# Patient Record
Sex: MALE | Race: WHITE | NOT HISPANIC OR LATINO | Employment: FULL TIME | URBAN - METROPOLITAN AREA
[De-identification: names, ages, dates, MRNs, and addresses within clinical notes are randomized per-mention and may not be internally consistent; named-entity substitution may affect disease eponyms.]

---

## 2017-02-08 ENCOUNTER — ALLSCRIPTS OFFICE VISIT (OUTPATIENT)
Dept: OTHER | Facility: OTHER | Age: 47
End: 2017-02-08

## 2017-05-22 ENCOUNTER — ALLSCRIPTS OFFICE VISIT (OUTPATIENT)
Dept: OTHER | Facility: OTHER | Age: 47
End: 2017-05-22

## 2017-06-06 ENCOUNTER — GENERIC CONVERSION - ENCOUNTER (OUTPATIENT)
Dept: OTHER | Facility: OTHER | Age: 47
End: 2017-06-06

## 2017-06-06 ENCOUNTER — ALLSCRIPTS OFFICE VISIT (OUTPATIENT)
Dept: OTHER | Facility: OTHER | Age: 47
End: 2017-06-06

## 2017-06-06 DIAGNOSIS — G45.9 TRANSIENT CEREBRAL ISCHEMIC ATTACK: ICD-10-CM

## 2017-06-07 ENCOUNTER — GENERIC CONVERSION - ENCOUNTER (OUTPATIENT)
Dept: OTHER | Facility: OTHER | Age: 47
End: 2017-06-07

## 2017-06-07 LAB
A/G RATIO (HISTORICAL): 1.4 (ref 1.2–2.2)
ALBUMIN SERPL BCP-MCNC: 4.3 G/DL (ref 3.5–5.5)
ALP SERPL-CCNC: 86 IU/L (ref 39–117)
ALT SERPL W P-5'-P-CCNC: 73 IU/L (ref 0–44)
AMBIG ABBREV CMP14 DEFAULT (HISTORICAL): NORMAL
AST SERPL W P-5'-P-CCNC: 45 IU/L (ref 0–40)
BASOPHILS # BLD AUTO: 0 %
BASOPHILS # BLD AUTO: 0 X10E3/UL (ref 0–0.2)
BILIRUB SERPL-MCNC: 0.4 MG/DL (ref 0–1.2)
BUN SERPL-MCNC: 17 MG/DL (ref 6–24)
BUN/CREA RATIO (HISTORICAL): 15 (ref 9–20)
CALCIUM SERPL-MCNC: 9.4 MG/DL (ref 8.7–10.2)
CHLORIDE SERPL-SCNC: 102 MMOL/L (ref 96–106)
CHOLEST SERPL-MCNC: 157 MG/DL (ref 100–199)
CO2 SERPL-SCNC: 20 MMOL/L (ref 18–29)
CREAT SERPL-MCNC: 1.1 MG/DL (ref 0.76–1.27)
DEPRECATED RDW RBC AUTO: 14.4 % (ref 12.3–15.4)
EGFR AFRICAN AMERICAN (HISTORICAL): 93 ML/MIN/1.73
EGFR-AMERICAN CALC (HISTORICAL): 80 ML/MIN/1.73
EOSINOPHIL # BLD AUTO: 0.2 X10E3/UL (ref 0–0.4)
EOSINOPHIL # BLD AUTO: 2 %
GLUCOSE SERPL-MCNC: 111 MG/DL (ref 65–99)
HCT VFR BLD AUTO: 44.2 % (ref 37.5–51)
HDLC SERPL-MCNC: 34 MG/DL
HGB BLD-MCNC: 15.6 G/DL (ref 12.6–17.7)
IMM.GRANULOCYTES (CD4/8) (HISTORICAL): 0 %
IMM.GRANULOCYTES (CD4/8) (HISTORICAL): 0 X10E3/UL (ref 0–0.1)
LDL/HDL RATIO (HISTORICAL): 2.3 RATIO UNITS (ref 0–3.6)
LDLC SERPL CALC-MCNC: 78 MG/DL (ref 0–99)
LYMPHOCYTES # BLD AUTO: 2.6 X10E3/UL (ref 0.7–3.1)
LYMPHOCYTES # BLD AUTO: 28 %
MCH RBC QN AUTO: 30.4 PG (ref 26.6–33)
MCHC RBC AUTO-ENTMCNC: 35.3 G/DL (ref 31.5–35.7)
MCV RBC AUTO: 86 FL (ref 79–97)
MONOCYTES # BLD AUTO: 0.8 X10E3/UL (ref 0.1–0.9)
MONOCYTES (HISTORICAL): 8 %
NEUTROPHILS # BLD AUTO: 5.7 X10E3/UL (ref 1.4–7)
NEUTROPHILS # BLD AUTO: 62 %
PLATELET # BLD AUTO: 219 X10E3/UL (ref 150–379)
POTASSIUM SERPL-SCNC: 4 MMOL/L (ref 3.5–5.2)
RBC (HISTORICAL): 5.13 X10E6/UL (ref 4.14–5.8)
SODIUM SERPL-SCNC: 142 MMOL/L (ref 134–144)
TOT. GLOBULIN, SERUM (HISTORICAL): 3.1 G/DL (ref 1.5–4.5)
TOTAL PROTEIN (HISTORICAL): 7.4 G/DL (ref 6–8.5)
TRIGL SERPL-MCNC: 227 MG/DL (ref 0–149)
VLDLC SERPL CALC-MCNC: 45 MG/DL (ref 5–40)
WBC # BLD AUTO: 9.3 X10E3/UL (ref 3.4–10.8)

## 2017-06-08 ENCOUNTER — GENERIC CONVERSION - ENCOUNTER (OUTPATIENT)
Dept: OTHER | Facility: OTHER | Age: 47
End: 2017-06-08

## 2017-06-08 LAB
INTERPRETATION (HISTORICAL): NORMAL
PROSTATE SPECIFIC ANTIGEN (HISTORICAL): 0.8 NG/ML (ref 0–4)
TSH SERPL DL<=0.05 MIU/L-ACNC: 2.62 UIU/ML (ref 0.45–4.5)

## 2017-06-12 ENCOUNTER — ALLSCRIPTS OFFICE VISIT (OUTPATIENT)
Dept: OTHER | Facility: OTHER | Age: 47
End: 2017-06-12

## 2017-06-12 DIAGNOSIS — R74.8 ABNORMAL LEVELS OF OTHER SERUM ENZYMES: ICD-10-CM

## 2017-06-12 LAB — OCCULT BLD, FECAL IMMUNOLOGICAL (HISTORICAL): NEGATIVE

## 2017-06-19 ENCOUNTER — GENERIC CONVERSION - ENCOUNTER (OUTPATIENT)
Dept: OTHER | Facility: OTHER | Age: 47
End: 2017-06-19

## 2017-06-20 ENCOUNTER — GENERIC CONVERSION - ENCOUNTER (OUTPATIENT)
Dept: OTHER | Facility: OTHER | Age: 47
End: 2017-06-20

## 2017-12-12 ENCOUNTER — ALLSCRIPTS OFFICE VISIT (OUTPATIENT)
Dept: OTHER | Facility: OTHER | Age: 47
End: 2017-12-12

## 2017-12-13 NOTE — PROGRESS NOTES
Assessment    1  Benign essential hypertension (401 1) (I10)   2  Other acute sinusitis (461 8) (J01 80)   3  Never a smoker   4  BMI 34 0-34 9,adult (V85 34) (Z68 34)   5  Obesity, Class I, BMI 30-34 9 (278 00) (E66 9)    Plan  Benign essential hypertension    · Enalapril Maleate 10 MG Oral Tablet; 1 every day  Obesity, Class I, BMI 30-34 9    · Avoid alcoholic beverages ; Status:Complete;   Done: 34GIU4599   · Begin a limited exercise program ; Status:Complete;   Done: 63OOM8609   · Begin or continue regular aerobic exercise  Gradually work up to at least 3 sessions of 30minutes of exercise a week ; Status:Complete;   Done: 29FKH6735   · Eat a low fat and low cholesterol diet ; Status:Complete;   Done: 49OEF1748   · Keep a diary of when and what you eat ; Status:Complete;   Done: 94YEZ4658   · Shared Decision Making Aid given; Status:Complete;   Done: 70VRM7876   · Some eating tips that can help you lose weight ; Status:Complete;   Done: 20UMV9154   · Stretch and warm up your muscles during the first 10 minutes , then cool down yourmuscles for the last 10 minutes of exercise ; Status:Complete;   Done: 70WPX1897   · There are many exercise options for seniors ; Status:Complete;   Done: 02HDE7999   · We encourage all of our patients to exercise regularly  30 minutes of exercise or physicalactivity five or more days a week is recommended for children and adults  ;Status:Complete;   Done: 56SBG6089   · We recommend that you bring your body mass index down to 26 ; Status:Complete;  Done: 06RQP9748   · We recommend that you change your eating habits slowly ; Status:Complete;   Done:14Chg4330   · We recommend you modify your diet to achieve and maintain a healthy weight  Lynnnick Campoverde may increase your risk for developing health problems such as diabetes,heart disease, and cancer  Avoid high fat foods and eat a balanced diet richin fruits and vegetables   The combination of a reduced-calorie diet and increasedphysical activity is recommended  Please let us know if you would like tolearn more about your nutrition and calorie needs, and additional options includingweight loss programs that can help you achieve your goals ; Status:Complete;   Done:39Pdy4530   · We want you to follow the Therapeutic Lifestyle Changes (TLC) diet ; Status:Complete;  Done: 07MZP5508   · Call [phone] if: You are considering suicide ; Status:Complete;   Done:72Cqh4372   · Call [phone] if: You are having difficulty sleeping (insomnia) ; Status:Complete;  Done: 16WHV0219   · Call [phone] if: You are urinating too frequently ; Status:Complete;   Done:79Ffu5503   · Call [phone] if: You feel thirsty most of the time ; Status:Complete;   Done:02Caw6897   · Call [phone] if: You feel your heart is beating very fast or skipping beats  ;Status:Complete;   Done: 51XVM7665   · Call [phone] if: You have feelings of extreme sadness and feelings ofhopelessness ; Status:Complete;   Done: 21BZN1445   · Call [phone] if: You have pain in your abdomen ; Status:Complete;   Done:89Stc4233   · Call [phone] if: You have symptoms of sleep apnea ; Status:Complete;   Done:75Wto9818   · Call [phone] if: You have sudden or severe chest pain with shortness of breath, rapidbreathing, or cough ; Status:Complete;   Done: 83WPU9359   · Seek Immediate Medical Attention if: You experience a new kind of chest pain (angina)or pressure ; Status:Complete;   Done: 88UJV2036  Other acute sinusitis    · Clarithromycin 500 MG Oral Tablet; TAKE 1 TABLET EVERY 12 HOURS DAILY    Chief Complaint  sinus congestion -ear discomfort      History of Present Illness  HPI: day after thanks giving developed sore throat and it seems to have ascended to his head   he has sinus congestion and painfever no chills      Review of Systems   Constitutional: feeling poorly, but-- as noted in HPI   ENT: earache,-- sore throat,-- nasal discharge-- and-- hoarseness, but-- as noted in HPI  Cardiovascular: no complaints of slow or fast heart rate, no chest pain, no palpitations, no leg claudication or lower extremity edema  Respiratory: cough, but-- as noted in HPI  Gastrointestinal: no complaints of abdominal pain, no constipation, no nausea or vomiting, no diarrhea or bloody stools  Genitourinary: no complaints of dysuria or incontinence, no hesitancy, no nocturia, no genital lesion, no inadequacy of penile erection  Musculoskeletal: no complaints of arthralgia, no myalgia, no joint swelling or stiffness, no limb pain or swelling  Active Problems  1  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)   2  Benign essential hypertension (401 1) (I10)   3  Elevated liver enzymes (790 5) (R74 8)   4  Encounter for screening for cardiovascular disorders (V81 2) (Z13 6)   5  High triglycerides (272 1) (E78 1)   6  Pemphigus (694 4) (L10 9)   7  Screening for diabetes mellitus (DM) (V77 1) (Z13 1)   8  Thyroid disorder (246 9) (E07 9)   9  TIA (transient ischemic attack) (435 9) (G45 9)   10  Well adult on routine health check (V70 0) (Z00 00)    Past Medical History  1  History of _ (526 89)   2  History of _ (272 2)   3  History of Abnormal findings on diagnostic imaging of abdomen (793 6) (R93 5)   4  History of Acute maxillary sinusitis (461 0) (J01 00)   5  History of Acute maxillary sinusitis, recurrence not specified (461 0) (J01 00)   6  History of Acute nonsuppurative otitis media, unspecified laterality (381 00) (H65 199)   7  History of Acute upper respiratory infection (465 9) (J06 9)   8  History of Allergic Reaction (995 3)   9  History of BMI 36 0-36 9,adult (V85 36) (Z68 36)   10  History of acute bronchitis (V12 69) (Z87 09)   11  History of acute pharyngitis (V12 69) (Z87 09)   12  History of acute sinusitis (V12 69) (Z87 09)   13  History of chest pain (V13 89) (Z87 898)   14  History of conjunctivitis (V12 49) (Z86 69)   15  History of diarrhea (V12 79) (Z87 898)   16  History of lymphadenopathy (V13 89) (Z87 898)   17  History of pharyngitis (V12 69) (Z87 09)   18  History of viral infection (V12 09) (Z86 19)   19  History of Obesity, Class II, BMI 35-39 9 (278 00) (E66 9)   20  Otitis externa (380 10) (H60 90)   21  History of Suppurative lymphadenitis (683) (L04 9)  Active Problems And Past Medical History Reviewed: The active problems and past medical history were reviewed and updated today  Family History  Father    1  Family history of malignant neoplasm of prostate (V16 42) (Z80 45)  Family History Reviewed: The family history was reviewed and updated today  Social History   · Never a smoker  The social history was reviewed and updated today  Surgical History    1  History of Knee Surgery  Surgical History Reviewed: The surgical history was reviewed and updated today  Current Meds   1  Aspirin 81 MG CAPS; 1 DAILY; Therapy: (Recorded:12Jun2017) to Recorded   2  CellCept 500 MG Oral Tablet; one in am -two in pm; Therapy: 43RPG9840 to Recorded   3  Enalapril Maleate 10 MG Oral Tablet; 1 every day; Therapy: 38WDW8151 to (Last Rx:06Jun2017)  Requested for: 12Jun2017 Ordered   4  Prevacid 15 MG Oral Capsule Delayed Release; One a day; Therapy: 88IBC8244 to Recorded   5  Vascepa 1 GM Oral Capsule; TAKE 2 CAPSULES BY MOUTH TWICE DAILY; Therapy: 42FQQ2571 to (Evaluate:03Dec2017)  Requested for: 82UYE0713; Last Rx:06Jun2017 Ordered    Allergies  1  Penicillins    Vitals   Recorded: 12Dec2017 11:04AM   Temperature 98 1 F   Heart Rate 84   Respiration 20   Systolic 192   Diastolic 76   Height 6 ft 3 in   Weight 276 lb    BMI Calculated 34 5   BSA Calculated 2 52       Physical Exam   Constitutional  General appearance: No acute distress, well appearing and well nourished  Eyes  Conjunctiva and lids: No swelling, erythema, or discharge     Ears, Nose, Mouth, and Throat  External inspection of ears and nose: Normal    Otoscopic examination: Abnormal   The right tympanic membrane was bulging  The left tympanic membrane was bulging  Nasal mucosa, septum, and turbinates: Abnormal   The bilateral nasal mucosa was red  Oropharynx: Abnormal   The posterior pharynx was erythematous  Pulmonary  Auscultation of lungs: Clear to auscultation, equal breath sounds bilaterally, no wheezes, no rales, no rhonci  Cardiovascular  Auscultation of heart: Normal rate and rhythm, normal S1 and S2, without murmurs           Signatures   Electronically signed by : Alyssa Morse DO; Dec 12 2017 11:14AM EST                       (Author)

## 2018-01-13 VITALS
TEMPERATURE: 98.9 F | SYSTOLIC BLOOD PRESSURE: 124 MMHG | RESPIRATION RATE: 12 BRPM | BODY MASS INDEX: 35.44 KG/M2 | WEIGHT: 276 LBS | HEART RATE: 80 BPM | DIASTOLIC BLOOD PRESSURE: 90 MMHG

## 2018-01-14 VITALS
DIASTOLIC BLOOD PRESSURE: 76 MMHG | WEIGHT: 267 LBS | HEIGHT: 75 IN | SYSTOLIC BLOOD PRESSURE: 114 MMHG | HEART RATE: 68 BPM | TEMPERATURE: 99.1 F | RESPIRATION RATE: 16 BRPM | BODY MASS INDEX: 33.2 KG/M2

## 2018-01-15 NOTE — MISCELLANEOUS
Provider Comments  Provider Comments:   called patient about no show appointment  called cell phone and LMOM to call back and reschedule  Then called home phone and there was no answer there        Signatures   Electronically signed by : Heron Newberry DO; Feb 8 2017  9:39PM EST                       (Author)

## 2018-01-16 NOTE — PROGRESS NOTES
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)   7  Elevated liver enzymes (790 5) (R74 8)    Plan  Benign essential hypertension    · Enalapril Maleate 10 MG Oral Tablet; 1 every day  Benign essential hypertension, TIA (transient ischemic attack)    · Aspirin 81 MG CAPS; 1 DAILY  Elevated liver enzymes    · (1) COMPREHENSIVE METABOLIC PANEL; Status:Active; Requested for:96Aet9420;    · US LIVER; Status:Active; Requested OJQ:11ZCG7085; Health Maintenance    · DIGITAL RECTAL EXAM; Status:Complete;   Done: 67YAF9096 11:04AM   · Hemoccult Screening - POC; Status:Complete;   Done: 36MJR2270 11:03AM    Chief Complaint  cpe      History of Present Illness  HM, Adult Male: The patient is being seen for a health maintenance evaluation  General Health:   Screening:   HPI: pt is here for full physical  Pt does have a 1st dgree relative with prostate Ca - pt does not see a urologist    Pt was diagnosed with 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 , pt states this is being watched by GI and he will be having a follow up      Review of Systems    Constitutional: No fever or chills, feels well, no tiredness, no recent weight gain or weight loss  Eyes: No complaints of eye pain, no red eyes, no discharge from eyes, no itchy eyes  ENT: no complaints of earache, no hearing loss, no nosebleeds, no nasal discharge, no sore throat, no hoarseness  Cardiovascular: No complaints of slow heart rate, no fast heart rate, no chest pain, no palpitations, no leg claudication, no lower extremity  Respiratory: No complaints of shortness of breath, no wheezing, no cough, no SOB on exertion, no orthopnea or PND     Gastrointestinal: No complaints of abdominal pain, no constipation, no nausea or vomiting, no diarrhea or bloody stools  Genitourinary: No complaints of dysuria, no incontinence, no hesitancy, no nocturia, no genital lesion, no testicular pain  Musculoskeletal: No complaints of arthralgia, no myalgias, no joint swelling or stiffness, no limb pain or swelling  Integumentary: No complaints of skin rash or skin lesions, no itching, no skin wound, no dry skin  Neurological: No compliants of headache, no confusion, no convulsions, no numbness or tingling, no dizziness or fainting, no limb weakness, no difficulty walking  Psychiatric: Is not suicidal, no sleep disturbances, no anxiety or depression, no change in personality, no emotional problems  Endocrine: No complaints of proptosis, no hot flashes, no muscle weakness, no erectile dysfunction, no deepening of the voice, no feelings of weakness  Hematologic/Lymphatic: No complaints of swollen glands, no swollen glands in the neck, does not bleed easily, no easy bruising  Active Problems    1  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)   2  Benign essential hypertension (401 1) (I10)   3  BMI 36 0-36 9,adult (V85 36) (Z68 36)   4  Encounter for screening for cardiovascular disorders (V81 2) (Z13 6)   5  Encounter for screening for malignant neoplasm of prostate (V76 44) (Z12 5)   6  High triglycerides (272 1) (E78 1)   7  Obesity, Class II, BMI 35-39 9 (278 00) (E66 9)   8  Pemphigus (694 4) (L10 9)   9  Screening for diabetes mellitus (DM) (V77 1) (Z13 1)   10  Screening for thyroid disorder (V77 0) (Z13 29)   11  Thyroid disorder (246 9) (E07 9)   12  TIA (transient ischemic attack) (435 9) (G45 9)   13   Well adult on routine health check (V70 0) (Z00 00)    Past Medical History    · History of _ (526 89)   · History of _ (272 2)   · History of Abnormal findings on diagnostic imaging of abdomen (793 6) (R93 5)   · History of Acute maxillary sinusitis (461 0) (J01 00)   · History of Acute maxillary sinusitis, recurrence not specified (461 0) (J01 00)   · History of Acute nonsuppurative otitis media, unspecified laterality (381 00) (H65 199)   · History of Acute upper respiratory infection (465 9) (J06 9)   · History of Allergic Reaction (995 3)   · History of acute bronchitis (V12 69) (Z87 09)   · History of acute pharyngitis (V12 69) (Z87 09)   · History of acute sinusitis (V12 69) (Z87 09)   · History of chest pain (V13 89) (U86 301)   · History of conjunctivitis (V12 49) (Z86 69)   · History of diarrhea (V12 79) (Y72 954)   · History of lymphadenopathy (V13 89) (L56 584)   · History of pharyngitis (V12 69) (Z87 09)   · History of viral infection (V12 09) (Z86 19)   · Otitis externa (380 10) (H60 90)   · History of Suppurative lymphadenitis (683) (L04 9)    Surgical History    · History of Knee Surgery    Family History  Father    · Family history of malignant neoplasm of prostate (V16 42) (Z80 45)    Social History    · Never a smoker    Current Meds   1  Aspirin 81 MG CAPS; 1 DAILY; Therapy: (Recorded:12Jun2017) to Recorded   2  Enalapril Maleate 10 MG Oral Tablet; 1 every day; Therapy: 86AWG6573 to (Last Rx:06Jun2017)  Requested for: 06Jun2017 Ordered   3  TobraDex 0 3-0 1 % Ophthalmic Suspension; use as dir; Therapy: (Elias Rojas) to Recorded   4  Vascepa 1 GM Oral Capsule; TAKE 2 CAPSULES BY MOUTH TWICE DAILY; Therapy: 66XFX6797 to (Evaluate:05Bma0986)  Requested for: 77HBT7593; Last   Rx:06Jun2017 Ordered    Allergies    1  Penicillins    Vitals   Recorded: 12Jun2017 10:44AM   Temperature 99 1 F   Heart Rate 68   Respiration 16   Systolic 664   Diastolic 76   Height 6 ft 3 in   Weight 267 lb    BMI Calculated 33 37   BSA Calculated 2 48     Physical Exam    Constitutional   General appearance: Abnormal   obese  Head and Face   Head and face: Normal     Palpation of the face and sinuses: No sinus tenderness  Eyes   Conjunctiva and lids: Abnormal   eyes red and hyperemic chronically     Pupils and irises: Equal, round, reactive to light  Ears, Nose, Mouth, and Throat   External inspection of ears and nose: Normal     Otoscopic examination: Tympanic membranes translucent with normal light reflex  Canals patent without erythema  Hearing: Normal     Nasal mucosa, septum, and turbinates: Normal without edema or erythema  Lips, teeth, and gums: Normal, good dentition  Oropharynx: Normal with no erythema, edema, exudate or lesions  Neck   Neck: Supple, symmetric, trachea midline, no masses  Thyroid: Normal, no thyromegaly  Pulmonary   Respiratory effort: No increased work of breathing or signs of respiratory distress  Auscultation of lungs: Clear to auscultation  Cardiovascular   Auscultation of heart: Normal rate and rhythm, normal S1 and S2, no murmurs  Carotid pulses: 2+ bilaterally  Abdominal aorta: Normal     Abdomen   Abdomen: Non-tender, no masses  Liver and spleen: No hepatomegaly or splenomegaly  Examination for hernias: No hernias appreciated  Anus, perineum, and rectum: Normal sphincter tone, no masses, no prolapse  Stool sample for occult blood: Negative  Genitourinary   Scrotal contents: Normal testes, no masses  Penis: Normal, no lesions  Digital rectal exam of prostate: Normal size, no masses  Lymphatic   Palpation of lymph nodes in neck: No lymphadenopathy  Palpation of lymph nodes in axillae: No lymphadenopathy  Palpation of lymph nodes in groin: No lymphadenopathy  Palpation of lymph nodes in other areas: No lymphadenopathy  Musculoskeletal   Gait and station: Normal     Inspection/palpation of digits and nails: Normal without clubbing or cyanosis      Inspection/palpation of joints, bones, and muscles: Normal     Range of motion: Normal        Results/Data  DIGITAL RECTAL EXAM 12Jun2017 11:04AM Edie Benitez     Test Name Result Flag Reference   DIGITAL RECTAL EXAM [id number]       Hemoccult Screening - POC 02DHW8798 11:03AM David Thomas Camilla Jammie     Test Name Result Flag Reference   Hemoccult Negative       [facility]) CBC/Diff Ambiguous Default 11TFN0386 12:15PM Oneil Bame     Test Name Result Flag Reference   WBC 9 3 x10E3/uL  3 4-10 8   RBC 5 13 x10E6/uL  4 14-5 80   Hemoglobin 15 6 g/dL  12 6-17 7   Hematocrit 44 2 %  37 5-51 0   MCV 86 fL  79-97   MCH 30 4 pg  26 6-33 0   MCHC 35 3 g/dL  31 5-35 7   RDW 14 4 %  12 3-15 4   Platelets 260 B66X4/IE  150-379   Neutrophils 62 %     Lymphs 28 %     Monocytes 8 %     Eos 2 %     Basos 0 %     Neutrophils (Absolute) 5 7 x10E3/uL  1 4-7 0   Lymphs (Absolute) 2 6 x10E3/uL  0 7-3 1   Monocytes(Absolute) 0 8 x10E3/uL  0 1-0 9   Eos (Absolute) 0 2 x10E3/uL  0 0-0 4   Baso (Absolute) 0 0 x10E3/uL  0 0-0 2   Immature Granulocytes 0 %     Immature Grans (Abs) 0 0 x10E3/uL  0 0-0 1     (1) PSA (SCREEN) (Dx V76 44 Screen for Prostate Cancer) 24OVU1034 12:15PM Oneil "[employer]e     Test Name Result Flag Reference   Prostate Specific Ag, Serum 0 8 ng/mL  0 0-4 0   Roche ECLIA methodology  According to the American Urological Association, Serum PSA should  decrease and remain at undetectable levels after radical  prostatectomy  The AUA defines biochemical recurrence as an initial  PSA value 0 2 ng/mL or greater followed by a subsequent confirmatory  PSA value 0 2 ng/mL or greater  Values obtained with different assay methods or kits cannot be used  interchangeably  Results cannot be interpreted as absolute evidence  of the presence or absence of malignant disease       (1) COMPREHENSIVE METABOLIC PANEL [id number] 25:55VI Oneil Bame     Test Name Result Flag Reference   Glucose, Serum 111 mg/dL H 65-99   BUN 17 mg/dL  6-24   Creatinine, Serum 1 10 mg/dL  0 76-1 27   BUN/Creatinine Ratio 15  9-20   Sodium, Serum 142 mmol/L  134-144   Potassium, Serum 4 0 mmol/L  3 5-5 2   Chloride, Serum 102 mmol/L     Carbon Dioxide, Total 20 mmol/L  18-29   Calcium, Serum 9 4 mg/dL  8 7-10 2   Protein, Total, Serum 7 4 g/dL 6 0-8 5   Albumin, Serum 4 3 g/dL  3 5-5 5   Globulin, Total 3 1 g/dL  1 5-4 5   A/G Ratio 1 4  1 2-2 2   Bilirubin, Total 0 4 mg/dL  0 0-1 2   Alkaline Phosphatase, S 86 IU/L     AST (SGOT) 45 IU/L H 0-40   ALT (SGPT) 73 IU/L H 0-44   eGFR If NonAfricn Am 80 mL/min/1 73  >59   eGFR If Africn Am 93 mL/min/1 73  >59     (LC) Lipid Panel With LDL/HDL Ratio 38BVY4699 12:15PM Edie Benitez     Test Name Result Flag Reference   Cholesterol, Total 157 mg/dL  100-199   Triglycerides 227 mg/dL H 0-149   HDL Cholesterol 34 mg/dL L >39   VLDL Cholesterol Iglesia 45 mg/dL H 5-40   LDL Cholesterol Calc 78 mg/dL  0-99   LDL/HDL Ratio 2 3 ratio units  0 0-3 6   LDL/HDL Ratio                                                             Men  Women                                               1/2 Avg  Risk  1 0    1 5                                                   Avg Risk  3 6    3 2                                                2X Avg  Risk  6 2    5 0                                                3X Avg  Risk  8 0    6 1       Procedure    Procedure: Indication: routine screening     Results: 20/20 in both eyes with corrective device, 20/20 in the right eye with corrective device, 20/20 in the left eye with corrective device   Color vision was and the results were normal       Signatures   Electronically signed by : Heron Newberry DO; Jun 12 2017 11:27AM EST                       (Author)

## 2018-01-16 NOTE — RESULT NOTES
Discussion/Summary   will discuss labs at follow up appt     Verified Results  [facility]) CBC/Diff Ambiguous Default [id number] 12:15PM Mihir Freud     Test Name Result Flag Reference   WBC 9 3 x10E3/uL  3 4-10 8   RBC 5 13 x10E6/uL  4 14-5 80   Hemoglobin 15 6 g/dL  12 6-17 7   Hematocrit 44 2 %  37 5-51 0   MCV 86 fL  79-97   MCH 30 4 pg  26 6-33 0   MCHC 35 3 g/dL  31 5-35 7   RDW 14 4 %  12 3-15 4   Platelets 023 S23H4/LV  150-379   Neutrophils 62 %     Lymphs 28 %     Monocytes 8 %     Eos 2 %     Basos 0 %     Neutrophils (Absolute) 5 7 x10E3/uL  1 4-7 0   Lymphs (Absolute) 2 6 x10E3/uL  0 7-3 1   Monocytes(Absolute) 0 8 x10E3/uL  0 1-0 9   Eos (Absolute) 0 2 x10E3/uL  0 0-0 4   Baso (Absolute) 0 0 x10E3/uL  0 0-0 2   Immature Granulocytes 0 %     Immature Grans (Abs) 0 0 x10E3/uL  0 0-0 1     [facility]) Devi Weber CMP14 Default [id number] 12:15PM Mihir Freud     Test Name Result Flag Reference   Devi Weber CMP14 Default Comment     A hand-written panel/profile was received from your office  In  accordance with the [facility] Ambiguous Test Code Policy dated July 0094, we have completed your order by using the closest currently  or formerly recognized AMA panel  We have assigned Comprehensive  Metabolic Panel (14), Test Code #059536 to this request   If this  is not the testing you wished to receive on this specimen, please  contact the [address] [place] [address] Client Inquiry/Technical Services Department  to clarify the test order  We appreciate your business  (1) PSA (SCREEN) (Dx V76 44 Screen for Prostate Cancer) [id number] 12:15PM Mihir Freud     Test Name Result Flag Reference   Prostate Specific Ag, Serum 0 8 ng/mL  0 0-4 0   Roche ECLIA methodology  According to the American Urological Association, Serum PSA should  decrease and remain at undetectable levels after radical  prostatectomy   The AUA defines biochemical recurrence as an initial  PSA value 0 2 ng/mL or greater followed by a subsequent confirmatory  PSA value 0 2 ng/mL or greater  Values obtained with different assay methods or kits cannot be used  interchangeably  Results cannot be interpreted as absolute evidence  of the presence or absence of malignant disease  [facility]) Cardiovascular Risk Assessment 07Jun2017 12:15PM George Blind     Test Name Result Flag Reference   Interpretation Note     Supplement report is available  PDF Image   (1) COMPREHENSIVE METABOLIC PANEL [id number] 99:21IK George Blind     Test Name Result Flag Reference   Glucose, Serum 111 mg/dL H 65-99   BUN 17 mg/dL  6-24   Creatinine, Serum 1 10 mg/dL  0 76-1 27   BUN/Creatinine Ratio 15  9-20   Sodium, Serum 142 mmol/L  134-144   Potassium, Serum 4 0 mmol/L  3 5-5 2   Chloride, Serum 102 mmol/L     Carbon Dioxide, Total 20 mmol/L  18-29   Calcium, Serum 9 4 mg/dL  8 7-10 2   Protein, Total, Serum 7 4 g/dL  6 0-8 5   Albumin, Serum 4 3 g/dL  3 5-5 5   Globulin, Total 3 1 g/dL  1 5-4 5   A/G Ratio 1 4  1 2-2 2   Bilirubin, Total 0 4 mg/dL  0 0-1 2   Alkaline Phosphatase, S 86 IU/L     AST (SGOT) 45 IU/L H 0-40   ALT (SGPT) 73 IU/L H 0-44   eGFR If NonAfricn Am 80 mL/min/1 73  >59   eGFR If Africn Am 93 mL/min/1 73  >59     (LC) Lipid Panel With LDL/HDL Ratio [id number] 12:15PM George Blind     Test Name Result Flag Reference   Cholesterol, Total 157 mg/dL  100-199   Triglycerides 227 mg/dL H 0-149   HDL Cholesterol 34 mg/dL L >39   VLDL Cholesterol Iglesia 45 mg/dL H 5-40   LDL Cholesterol Calc 78 mg/dL  0-99   LDL/HDL Ratio 2 3 ratio units  0 0-3 6   LDL/HDL Ratio                                                             Men  Women                                               1/2 Avg  Risk  1 0    1 5                                                   Avg Risk  3 6    3 2                                                2X Avg  Risk  6 2    5 0                                                3X Avg  Risk  8 0    6 1     (1) TSH [id number] 12:15PM Asha Moe Emily Johnson     Test Name Result Flag Reference   TSH 2 620 uIU/mL  0 450-4 500

## 2018-01-18 NOTE — MISCELLANEOUS
Provider Comments  Provider Comments:   Called No Show and mailbox is full  Cannot leave a message   Will try to call other phone no      Signatures   Electronically signed by : Radha Huff DO; Oct  3 2016  1:12PM EST                       (Author)

## 2018-01-22 VITALS
SYSTOLIC BLOOD PRESSURE: 124 MMHG | DIASTOLIC BLOOD PRESSURE: 90 MMHG | TEMPERATURE: 98.2 F | BODY MASS INDEX: 34.78 KG/M2 | RESPIRATION RATE: 18 BRPM | HEIGHT: 74 IN | HEART RATE: 76 BPM | WEIGHT: 271 LBS

## 2018-01-23 VITALS
BODY MASS INDEX: 34.32 KG/M2 | TEMPERATURE: 98.1 F | SYSTOLIC BLOOD PRESSURE: 116 MMHG | HEIGHT: 75 IN | RESPIRATION RATE: 20 BRPM | WEIGHT: 276 LBS | DIASTOLIC BLOOD PRESSURE: 76 MMHG | HEART RATE: 84 BPM

## 2018-01-23 NOTE — MISCELLANEOUS
Assessment   1  TIA (transient ischemic attack) (435 9) (G45 9)1   2  Benign essential hypertension (401 1) (I10)1   3  High triglycerides (272 1) (E78 1)1   4  Never a smoker1      1 Amended By: Claudio Sebastian ; Jun 06 2017 11:47 AM EST    Plan  Benign essential hypertension    · Start: Enalapril Maleate 10 MG Oral Tablet; 1 every day1   Rx By: Claudio Sebastian; Dispense: 0 Days ; #:90 Tablet; Refill: 1;For: Benign essential   hypertension; HORACIO = N;1 1,2  Verified Transmission to [facility] #816;2 1,2  Last Updated By: System, [employer]; 6/6/2017 11:47:49   AM2   Benign essential hypertension, High triglycerides, TIA (transient ischemic attack)    · Start: Vascepa 1 GM Oral Capsule; TAKE 2 CAPSULES BY MOUTH TWICE DAILY1   Rx By: Claudio Sebastian; Dispense: 90 Days ; #:360 Capsule; Refill: 1;For: Benign   essential hypertension, High triglycerides, TIA (transient ischemic attack); HORACIO = N;   1    1,2    Verified Transmission to [facility] #816;   2    1,2    Last Updated By: System, [employer]; 6/6/2017 11:47:48 AM   2   TIA (transient ischemic attack)    · (1) LYME ANTIBODY PROFILE W/REFLEX TO WESTERN BLOT; Status:Active; Requested  IPX:[id number]; 1   Perform:[facility]; BLQ:[id number]; Ordered; For:TIA (transient ischemic attack); Ordered   By:Lombardi, Aldon Keen T1    · ECHO COMPLETE WITH CONTRAST IF INDICATED; Status:Need Information - Financial  Authorization; Requested GEZ:[id number]; 1   Perform:[facility]; KBP:[id number]; Ordered; For:TIA (transient ischemic   attack); Ordered By:Lombardi, Aldon Keen T1      1 Amended By: Claudio Sebastian ; Jun 06 2017 11:42 AM EST   2 Amended By: Claudio Sebastian ; Jun 06 2017 11:48 AM EST    Discussion/Summary  Discussion Summary:   Long conversation had with pt about secondary prevention, BP control as well as trigs etc  PT now on ASA and should stay on it   I will get his labs data from hospital not back yet  Will follow bP at his physical1        1 Amended By: Quintin Sousa ; Jun 06 2017 11:50 AM EST    Chief Complaint   I spoke with Bing Quintero on 6/5/17 after his hospital discharge to home yesterday  He was admitted to [facility] for "possible TIA" I do not have records on him but I faxed a request  He is feeling well and offers no complaints  I reviewed his medications with him and updated his chart  He denies chest pain, dyspnea, weakness  He was not told to follow up with any specialists and he was cleared by Neurology after having a normal Brain MRI  He is aware to call our office at any time with any questions or concerns and he is aware to go to the ER if any chest pain, dyspnea, weakness, dizziness, palpitation, numbness, etc JMoyleLPN       Chief Complaint Free Text Note Form: TCM Possible TIA [id number]        2 Amended By: Richa Salmeron; Jun 06 2017 11:15 AM EST    History of Present Illness  TCM Communication [facility]: The patient is being contacted for follow-up after hospitalization  Hospital records were not available and  I faxed a request for his medical records1   He was hospitalized at and [facility]  The date of discharge: 6/4/17  Diagnosis: Possible TIA  He was discharged to home  Medications reviewed and updated today  He scheduled a follow up appointment  Counseling was provided to the patient  Topics counseled included instructions for management, risk factor reductions, patient and family education, activities of daily living and importance of compliance with treatment  Communication performed and completed by [facility] LPN 6/5/61   HPI: nurses tcm note appreciated    Pt states friday night he was at a baseball game with his son, his left arm and hand started feeling tingly, then hios chin and left side of face an eyebrows also got tingly  Pt looked it up on the computer and said he could have a mini stroke   was evaluated in the ed - ct scan was neg - pt was negative   Pt states his muscle also hurt and says his rhabdo was high - he was given fluids  MRI also done was omk as per pt  US of arteris and neck were clean  pt was also going to get an echo but they do not do on the weekend so will need as an out,  Pt was discharged two days ago  no symptoms since then  the numbness went away 6 am that night    pt states he siad he had a virus three weeks agio usually after a virus he has issues2        1 Amended By: Yen Andrews; Jun 05 2017 1:31 PM EST   2 Amended By: En Montemayor ; Jun 06 2017 11:31 AM EST    Review of Systems  Complete-Male:   Constitutional:1  No fever or chills, feels well, no tiredness, no recent weight gain or weight loss1   Eyes:1  No complaints of eye pain, no red eyes, no discharge from eyes, no itchy eyes1   ENT:1  no complaints of earache, no hearing loss, no nosebleeds, no nasal discharge, no sore throat, no hoarseness1   Cardiovascular: 1  No complaints of slow heart rate, no fast heart rate, no chest pain, no palpitations, no leg claudication, no lower extremity1   Respiratory:1  No complaints of shortness of breath, no wheezing, no cough, no SOB on exertion, no orthopnea or PND1   Gastrointestinal:1  No complaints of abdominal pain, no constipation, no nausea or vomiting, no diarrhea or bloody stools1   Genitourinary:1  No complaints of dysuria, no incontinence, no hesitancy, no nocturia, no genital lesion, no testicular pain1   Musculoskeletal:1  No complaints of arthralgia, no myalgias, no joint swelling or stiffness, no limb pain or swelling1   Integumentary:1  No complaints of skin rash or skin lesions, no itching, no skin wound, no dry skin1   Neurological:1  tingling1 , but as noted in [address]   Psychiatric:1  Is not suicidal, no sleep disturbances, no anxiety or depression, no change in personality, no emotional problems1      Endocrine:1  No complaints of proptosis, no hot flashes, no muscle weakness, no erectile dysfunction, no deepening of the voice, no feelings of weakness1         1 Amended By: Charley Pool ; Jun 06 2017 11:49 AM EST    Active Problems   1  Acute URI (465 9) (J06 9)  2  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)  3  Benign essential hypertension (401 1) (I10)  4  BMI 36 0-36 9,adult (V85 36) (Z68 36)  5  Conjunctivitis (372 30) (H10 9)  6  Encounter for screening for cardiovascular disorders (V81 2) (Z13 6)  7  Encounter for screening for malignant neoplasm of prostate (V76 44) (Z12 5)  8  Obesity, Class II, BMI 35-39 9 (278 00) (E66 9)  9  Pemphigus (694 4) (L10 9)  10  Pharyngitis (462) (J02 9)  11  Screening for diabetes mellitus (DM) (V77 1) (Z13 1)  12  Screening for thyroid disorder (V77 0) (Z13 29)  13  Thyroid disorder (246 9) (E07 9)  14  Well adult on routine health check (V70 0) (Z00 00)    Past Medical History   1  History of _ (526 89)  2  History of _ (272 2)  3  History of Abnormal findings on diagnostic imaging of abdomen (793 6) (R93 5)  4  History of Acute maxillary sinusitis (461 0) (J01 00)  5  History of Acute maxillary sinusitis, recurrence not specified (461 0) (J01 00)  6  History of Acute nonsuppurative otitis media, unspecified laterality (381 00) (H65 199)  7  History of Acute upper respiratory infection (465 9) (J06 9)  8  History of Allergic Reaction (995 3)  9  History of acute bronchitis (V12 69) (Z87 09)  10  History of acute pharyngitis (V12 69) (Z87 09)  11  History of acute sinusitis (V12 69) (Z87 09)  12  History of chest pain (V13 89) (Z87 898)  13  History of diarrhea (V12 79) (Z87 898)  14  History of lymphadenopathy (V13 89) (Z87 898)  15  History of viral infection (V12 09) (Z86 19)  16  Otitis externa (380 10) (H60 90)  17  History of Suppurative lymphadenitis (683) (L04 9)    Surgical History  Surgical History Reviewed: The surgical history was reviewed and updated today  1        1 Amended By: Charley Pool ; Jun 06 2017 11:34 AM EST    Family History  Father   1   Family history of malignant neoplasm of prostate (V16 42) (Z80 42)  Family History Reviewed: The family history was reviewed and updated today  1        1 Amended By: Duyen Gonzalez ; Jun 06 2017 11:34 AM EST    Social History    · Never a smoker1     Social History Reviewed: The social history was reviewed and updated today  1        1 Amended By: Duyen Gonzalez ; Jun 06 2017 11:47 AM EST    Current Meds  1  1   2  Aspirin 81 MG CAPS; 1 DAILY; Therapy: (Recorded:06Jun2017) to Recorded1   3  Enalapril Maleate 10 MG Oral Tablet; 1 every day; Therapy: [id number] to (Last Rx:06Jun2017)  Requested for: 06Jun2017 Ordered1   4  TobraDex 0 3-0 1 % Ophthalmic Suspension (Tobramycin-Dexamethasone);1  use as dir; Therapy: (Meghan Pulse) to Recorded  5  Vascepa 1 GM Oral Capsule; TAKE 2 CAPSULES BY MOUTH TWICE DAILY; Therapy: 09WNJ5894 to (Evaluate:90Npp0992)  Requested for: 17RKE7528; Last   Rx:06Jun2017 Ordered1   Medication List Reviewed: The medication list was reviewed and updated today  1 Amended By: Duyen Gonzalez ; Jun 06 2017 11:49 AM EST    Allergies   1  Penicillins    Physical Exam    Constitutional1    General appearance: No acute distress, well appearing and well nourished  1    Eyes1    Conjunctiva and lids: No swelling, erythema, or discharge  1    Pupils and irises: Equal, round and reactive to light  1    Ears, Nose, Mouth, and Throat1    External inspection of ears and nose: Normal 1    Otoscopic examination: Tympanic membrance translucent with normal light reflex  Canals patent without erythema  1    Nasal mucosa, septum, and turbinates: Normal without edema or erythema  1    Oropharynx: Normal with no erythema, edema, exudate or lesions  1    Pulmonary1    Auscultation of lungs: Clear to auscultation, equal breath sounds bilaterally, no wheezes, no rales, no rhonci  1    Cardiovascular1    Auscultation of heart: Normal rate and rhythm, normal S1 and S2, without murmurs  1    Abdomen1    Abdomen: Non-tender, no masses  1    Liver and spleen: No hepatomegaly or splenomegaly  1    Lymphatic1    Palpation of lymph nodes in neck: No lymphadenopathy  1    Musculoskeletal1    Gait and station: Normal 1    Skin1    Skin and subcutaneous tissue: Normal without rashes or lesions  1    Neurologic1    Cranial nerves: Cranial nerves 2-12 intact  1    Reflexes: 2+ and symmetric  1          1 Amended By: Quintin Sousa ; Jun 06 2017 11:49 AM EST    Results/Data  (1) LIPID PANEL, FASTING1 67ALS4776 12:00AM1      Test Name Result Flag Reference   CHOLESTEROL1 3477     MDA,TYGEAT0 822     LDL CHOLESTEROL CALCULATED1 [address] [address] [facility] [address] A1      Summary / No summary entered :      No summary entered  Documents attached :      sHoital - Emely [place]: [id number] - Image Encounter - [facility]) (Additional Information Document)       1  Amended By: Quintin Sousa ; Jun 06 2017 11:36 AM EST Message   Recorded as Task   Date: 06/05/2017 09:11 AM, Created By: Atilio Manzano   Task Name: Follow Up   Assigned To: Sadi Boone   Regarding Patient: Redd Austin, Status: Active   CommentMarsh Angélica - 05 Jun 2017 9:11 AM     TASK CREATED  Caller: Self; Other; [phone] (Home); [phone] (Work)  Veena Avina was in [facility] from Friday to Sunday  Can we TCM for other hospitals?      He has an apt with Dr Choudhary Resides on Tuesday     Future Appointments    Date/Time Provider Specialty Site   06/06/2017 11:30 AM Idania Strickland [address]   06/12/2017 10:45 AM Idania Strickland [address]   Electronically signed by : Isaiah Quintana DO; Jun 5 2017  1:30PM EST                       (Author)    Electronically signed by : Sophie Hutchison, ; Jun 5 2017  1:33PM EST                       (Co-author)    Electronically signed by : Isaiah Quintana DO; Jun 5 2017  1:48PM EST (Author)    Electronically signed by : Oneal Manzo DO; Jun 6 2017 11:50AM EST                       (Author)

## 2018-02-12 DIAGNOSIS — I10 BENIGN ESSENTIAL HYPERTENSION: Primary | ICD-10-CM

## 2018-02-12 PROBLEM — R74.8 ELEVATED LIVER ENZYMES: Status: ACTIVE | Noted: 2017-06-12

## 2018-02-12 PROBLEM — E78.1 HIGH TRIGLYCERIDES: Status: ACTIVE | Noted: 2017-06-06

## 2018-02-12 PROBLEM — G45.9 TIA (TRANSIENT ISCHEMIC ATTACK): Status: ACTIVE | Noted: 2017-06-06

## 2018-02-12 RX ORDER — ENALAPRIL MALEATE 10 MG/1
TABLET ORAL
Qty: 90 TABLET | Refills: 1 | Status: SHIPPED | OUTPATIENT
Start: 2018-02-12 | End: 2018-08-27

## 2018-03-13 ENCOUNTER — OFFICE VISIT (OUTPATIENT)
Dept: FAMILY MEDICINE CLINIC | Facility: CLINIC | Age: 48
End: 2018-03-13
Payer: COMMERCIAL

## 2018-03-13 VITALS
SYSTOLIC BLOOD PRESSURE: 130 MMHG | TEMPERATURE: 98.3 F | HEIGHT: 74 IN | WEIGHT: 277 LBS | BODY MASS INDEX: 35.55 KG/M2 | DIASTOLIC BLOOD PRESSURE: 82 MMHG | HEART RATE: 72 BPM | RESPIRATION RATE: 18 BRPM

## 2018-03-13 DIAGNOSIS — I10 BENIGN ESSENTIAL HYPERTENSION: ICD-10-CM

## 2018-03-13 DIAGNOSIS — R93.1 ABNORMAL ECHOCARDIOGRAM: ICD-10-CM

## 2018-03-13 DIAGNOSIS — G45.9 TRANSIENT CEREBRAL ISCHEMIA, UNSPECIFIED TYPE: ICD-10-CM

## 2018-03-13 DIAGNOSIS — T78.40XA ALLERGIC REACTION, INITIAL ENCOUNTER: Primary | ICD-10-CM

## 2018-03-13 PROCEDURE — 3008F BODY MASS INDEX DOCD: CPT | Performed by: FAMILY MEDICINE

## 2018-03-13 PROCEDURE — 99214 OFFICE O/P EST MOD 30 MIN: CPT | Performed by: FAMILY MEDICINE

## 2018-03-13 PROCEDURE — 3079F DIAST BP 80-89 MM HG: CPT | Performed by: FAMILY MEDICINE

## 2018-03-13 PROCEDURE — 3075F SYST BP GE 130 - 139MM HG: CPT | Performed by: FAMILY MEDICINE

## 2018-03-13 RX ORDER — TOBRAMYCIN AND DEXAMETHASONE 3; 1 MG/ML; MG/ML
SUSPENSION/ DROPS OPHTHALMIC
COMMUNITY
End: 2021-05-07

## 2018-03-13 RX ORDER — PREDNISONE 20 MG/1
TABLET ORAL
Refills: 0 | COMMUNITY
Start: 2018-03-04 | End: 2018-10-09 | Stop reason: ALTCHOICE

## 2018-03-13 RX ORDER — ASPIRIN 81 MG/1
TABLET ORAL DAILY
COMMUNITY

## 2018-03-13 RX ORDER — EPINEPHRINE 0.3 MG/.3ML
0.3 INJECTION SUBCUTANEOUS ONCE
Qty: 1 EACH | Refills: 0 | Status: SHIPPED | OUTPATIENT
Start: 2018-03-13 | End: 2021-05-22

## 2018-03-13 RX ORDER — LANSOPRAZOLE 15 MG/1
15 CAPSULE, DELAYED RELEASE ORAL DAILY
COMMUNITY
End: 2018-10-09 | Stop reason: ALTCHOICE

## 2018-03-13 RX ORDER — MYCOPHENOLATE MOFETIL 500 MG/1
TABLET ORAL
COMMUNITY
Start: 2017-12-12 | End: 2018-10-22 | Stop reason: ALTCHOICE

## 2018-03-13 RX ORDER — ENALAPRIL MALEATE 10 MG/1
TABLET ORAL DAILY
COMMUNITY
Start: 2017-06-06 | End: 2018-08-27 | Stop reason: SDUPTHER

## 2018-03-13 NOTE — PROGRESS NOTES
Assessment/Plan:    Problem List Items Addressed This Visit     Benign essential hypertension     stable         Relevant Medications    aspirin (ASPIRIN ADULT LOW DOSE) 81 mg EC tablet    enalapril (VASOTEC) 10 mg tablet    EPINEPHrine (EPIPEN) 0 3 mg/0 3 mL SOAJ    Abnormal echocardiogram    Relevant Orders    Echo complete with contrast if indicated      Other Visit Diagnoses     Allergic reaction, initial encounter    -  Primary    Relevant Medications    EPINEPHrine (EPIPEN) 0 3 mg/0 3 mL SOAJ    Other Relevant Orders    Allergy Evaluation 1, [facility]    Allergy Panel 18, [employer] [facility]    Penicillin G IgE    Food Specific IgG Allergy (Adult) Panel          There are no Patient Instructions on file for this visit  No Follow-up on file  Subjective:      Patient ID: Monse Lynch is a 52 y o  male  Chief Complaint   Patient presents with    Follow-up     ER       Pt is here for an ed follow up    Pt states he was sitting in a chair and all of a sudden went flush  Pt states he immidiatly took a baby aspirin  Pt states he could hear his heart beat in his ear  Went to the ed  Pt had an EKG - pt states his cheakes were red - was advised he was having an allergic reaction  Was told his uvula was swollen  Pt was given a steroid he was told his lungs were affected  This was 10 days ago  Symptom,s completely resolved  His BP was elevated at that time    Pt is wondering if he is developing a peanut allergy he had two other occasions where he ate peanuts and felt weird  The morning of tjhis incident he had receased peanut butter cups  Few days ago his wife opened a peanut butter jar and he wanted her to close it    Pt states he took himself off the celcept    Pt states he has had the ear infection in left ear for two months, does noty hurt per say he had it for a few weeks it resolved  No pain but its clogged    Pt states while he is here he had an echo done a year ago at [facility]            The following portions of the patient's history were reviewed and updated as appropriate: allergies, current medications, past family history, past medical history, past social history, past surgical history and problem list     Review of Systems   Constitutional: Negative for activity change, appetite change, chills, diaphoresis, fatigue, fever and unexpected weight change  HENT: Negative for congestion, rhinorrhea, sinus pain, sinus pressure and sore throat  Eyes: Negative for discharge and itching  Respiratory: Negative for chest tightness and shortness of breath  Cardiovascular: Negative for chest pain, palpitations and leg swelling  Gastrointestinal: Negative for abdominal distention  Endocrine: Negative for cold intolerance and heat intolerance  Genitourinary: Negative for difficulty urinating and testicular pain  Allergic/Immunologic: Negative for environmental allergies, food allergies and immunocompromised state  Neurological: Negative for dizziness and numbness  Psychiatric/Behavioral: Negative for agitation  Current Outpatient Prescriptions   Medication Sig Dispense Refill    aspirin (ASPIRIN ADULT LOW DOSE) 81 mg EC tablet Take by mouth daily      enalapril (VASOTEC) 10 mg tablet TAKE ONE TABLET ONCE DAILY 90 tablet 1    lansoprazole (PREVACID) 15 mg capsule Take 15 mg by mouth daily      mycophenolate (CELLCEPT) 500 mg tablet Take by mouth      tobramycin-dexamethasone (TOBRADEX) ophthalmic suspension Apply to eye      enalapril (VASOTEC) 10 mg tablet Take by mouth daily      EPINEPHrine (EPIPEN) 0 3 mg/0 3 mL SOAJ Inject 0 3 mL (0 3 mg total) into the shoulder, thigh, or buttocks once for 1 dose As needed for anaphylaxis, proceed to ER 1 each 0    predniSONE 20 mg tablet TAKE TWO TABLETS ONCE A DAY  0     No current facility-administered medications for this visit          Objective:    /82   Pulse 72   Temp 98 3 °F (36 8 °C)   Resp 18   Ht 6' 2" (1 88 m) Wt 126 kg (277 lb)   BMI 35 56 kg/m²        Physical Exam   Constitutional: He appears well-developed and well-nourished  HENT:   Head: Normocephalic  Eyes: Conjunctivae and EOM are normal  Pupils are equal, round, and reactive to light  Neck: Normal range of motion  Cardiovascular: Normal rate, regular rhythm and normal heart sounds  Pulmonary/Chest: Effort normal    Abdominal: Soft  Musculoskeletal: Normal range of motion  Neurological: He is alert  Skin: Skin is warm  He is not diaphoretic  Nursing note and vitals reviewed               Durwood Cast, DO

## 2018-03-20 ENCOUNTER — TELEPHONE (OUTPATIENT)
Dept: FAMILY MEDICINE CLINIC | Facility: CLINIC | Age: 48
End: 2018-03-20

## 2018-03-20 NOTE — TELEPHONE ENCOUNTER
Please submit whatever paperwork is requested for the cho    If rejected the pt will need to be sent to cardio for evaluation

## 2018-03-22 NOTE — TELEPHONE ENCOUNTER
Spoke with pt, he is aware to see cardio, due to his insurance pt will have to go to [facility] he stated   Kavya Barney

## 2018-08-27 DIAGNOSIS — I10 BENIGN ESSENTIAL HYPERTENSION: ICD-10-CM

## 2018-08-27 RX ORDER — ENALAPRIL MALEATE 10 MG/1
10 TABLET ORAL DAILY
Qty: 90 TABLET | Refills: 1 | Status: SHIPPED | OUTPATIENT
Start: 2018-08-27 | End: 2019-01-31 | Stop reason: SDUPTHER

## 2018-10-09 ENCOUNTER — OFFICE VISIT (OUTPATIENT)
Dept: FAMILY MEDICINE CLINIC | Facility: CLINIC | Age: 48
End: 2018-10-09
Payer: COMMERCIAL

## 2018-10-09 VITALS
WEIGHT: 275.4 LBS | HEART RATE: 80 BPM | BODY MASS INDEX: 35.34 KG/M2 | HEIGHT: 74 IN | DIASTOLIC BLOOD PRESSURE: 92 MMHG | RESPIRATION RATE: 20 BRPM | TEMPERATURE: 98.5 F | SYSTOLIC BLOOD PRESSURE: 138 MMHG

## 2018-10-09 DIAGNOSIS — J02.9 ACUTE PHARYNGITIS, UNSPECIFIED ETIOLOGY: Primary | ICD-10-CM

## 2018-10-09 LAB — S PYO AG THROAT QL: NEGATIVE

## 2018-10-09 PROCEDURE — 87880 STREP A ASSAY W/OPTIC: CPT | Performed by: NURSE PRACTITIONER

## 2018-10-09 PROCEDURE — 99213 OFFICE O/P EST LOW 20 MIN: CPT | Performed by: NURSE PRACTITIONER

## 2018-10-09 RX ORDER — PREDNISONE 20 MG/1
20 TABLET ORAL DAILY
Qty: 7 TABLET | Refills: 0 | Status: SHIPPED | OUTPATIENT
Start: 2018-10-09 | End: 2018-10-22 | Stop reason: ALTCHOICE

## 2018-10-09 RX ORDER — AZITHROMYCIN 250 MG/1
TABLET, FILM COATED ORAL
Qty: 6 TABLET | Refills: 0 | Status: SHIPPED | OUTPATIENT
Start: 2018-10-09 | End: 2018-10-14

## 2018-10-09 RX ORDER — FOLIC ACID 1 MG/1
TABLET ORAL
Refills: 3 | COMMUNITY
Start: 2018-08-07 | End: 2021-05-07

## 2018-10-09 NOTE — PATIENT INSTRUCTIONS
Take medication with food  It is important that you take the entire course of antibiotics prescribed  May also take a probiotic of your choice to maintain healthy GI tani  Can take some probiotic and yogurt with the medication  Supportive care discussed and advised  Follow up for no improvement and worsening of conditions  Patient advised and educated when to see immediate medical care  Azithromycin (By mouth)   Azithromycin (kn-yjvq-xzk-MYE-sin)  Treats infections  This medicine is a macrolide antibiotic  Brand Name(s): Zithromax, Zithromax Tri-Edgard, Zithromax Z-Edgard, Zmax   There may be other brand names for this medicine  When This Medicine Should Not Be Used: This medicine is not right for everyone  Do not use it if you had an allergic reaction to azithromycin, erythromycin, or similar medicines, or you have a history of liver problems caused by azithromycin  How to Use This Medicine:   Capsule, Liquid, Packet, Powder, Tablet  · Your doctor will tell you how much medicine to use  Do not use more than directed  · Take all of the medicine in your prescription to clear up your infection, even if you feel better after the first few doses  · Read and follow the patient instructions that come with this medicine  Talk to your doctor or pharmacist if you have any questions  · Multiple dose (Zithromax® oral liquid or tablets):   ¨ You may take this medicine with or without food  ¨ Shake the bottle well before you measure the medicine  Measure the oral liquid medicine with a marked measuring spoon, oral syringe, or medicine cup  · Single dose (Zmax® extended-release oral liquid or powder):   ¨ Liquid:   § Take this medicine on an empty stomach at least 1 hour before you eat, or 2 hours after you eat  § Call your doctor right away if you vomit within 1 hour after you take the medicine  § You must take the liquid within 12 hours after the pharmacist gives it to you    § Shake the bottle well before you measure the medicine  Measure your dose with a marked measuring spoon, cup, or syringe  ¨ Powder:   § Open 1 packet and pour all of the medicine into a glass with about 2 ounces (¼ cup) of water  Mix well and drink the medicine right away  Pour another 2 ounces of water into the same glass, and drink the remaining medicine  · Missed dose: If you are taking multiple doses, take the dose as soon as you remember  If it is almost time for your next dose, wait until then to take a regular dose  Do not use extra medicine to make up for a missed dose  · Store the medicine in a closed container at room temperature, away from heat, moisture, and direct light  · Extended-release oral liquid: Do not refrigerate or freeze  · Oral liquid for 1 dose only: Store at room temperature  Do not store in the refrigerator or allow the medicine to freeze  · Oral liquid for multiple doses: Store at room temperature or in the refrigerator  Use it within 10 days of filling the prescription  Drugs and Foods to Avoid:   Ask your doctor or pharmacist before using any other medicine, including over-the-counter medicines, vitamins, and herbal products  · Some medicines can affect how azithromycin works  Tell your doctor if you are also using any of the following:  ¨ Cyclosporine, digoxin, nelfinavir, or phenytoin  ¨ Blood thinner  [address] Medicine for a heart rhythm problem (including amiodarone, dofetilide, procainamide, quinidine, sotalol)  · Zithromax® for multiple doses: Do not take an antacid that contains magnesium or aluminum at the same time you take Zithromax®  An antacid will affect how the medicine works  Antacids will not affect Zmax® for single dose  Warnings While Using This Medicine:   · Tell your doctor if you are pregnant or breastfeeding, or if you have kidney disease, liver disease, heart disease, heart rhythm problems, heart failure, or myasthenia gravis   Tell your doctor if anyone in your family has heart rhythm problems  · This medicine may cause the following problems:   ¨ Serious skin reactions  ¨ Liver problems  ¨ Infantile hypertrophic pyloric stenosis  ¨ Heart rhythm problems  · This medicine can cause diarrhea  Call your doctor if the diarrhea becomes severe, does not stop, or is bloody  Do not take any medicine to stop diarrhea until you have talked to your doctor  Diarrhea can occur 2 months or more after you stop taking this medicine  It may occur 2 months or more after you stop using this medicine  · Call your doctor if your symptoms do not improve or if they get worse  · Keep all medicine out of the reach of children  Never share your medicine with anyone  Possible Side Effects While Using This Medicine:   Call your doctor right away if you notice any of these side effects:  · Allergic reaction: Itching or hives, swelling in your face or hands, swelling or tingling in your mouth or throat, chest tightness, trouble breathing  · Blistering, peeling, red skin rash  · Dark urine, pale stools, nausea, vomiting, loss of appetite, stomach pain, yellow skin or eyes  · Double vision, tiredness or weakness  · Fainting, dizziness, lightheadedness  · Fast, pounding, or uneven heartbeat, chest pain  · Feeling irritable or vomits after feeding (in babies)  · Severe diarrhea that may contain blood, stomach cramps, fever  If you notice these less serious side effects, talk with your doctor:   · Mild diarrhea, nausea, vomiting, stomach pain  If you notice other side effects that you think are caused by this medicine, tell your doctor  Call your doctor for medical advice about side effects  You may report side effects to FDA at 0-913-FDA-2430  © 2017 [address] [employer] Information is for End User's use only and may not be sold, redistributed or otherwise used for commercial purposes  The above information is an  only   It is not intended as medical advice for individual conditions or treatments  Talk to your doctor, nurse or pharmacist before following any medical regimen to see if it is safe and effective for you

## 2018-10-09 NOTE — PROGRESS NOTES
Assessment/Plan:  Take medication with food  It is important that you take the entire course of antibiotics prescribed  May also take a probiotic of your choice to maintain healthy GI tani  Can take some probiotic and yogurt with the medication  Supportive care discussed and advised  Follow up for no improvement and worsening of conditions  Patient advised and educated when to see immediate medical care  Diagnoses and all orders for this visit:    Acute pharyngitis, unspecified etiology  -     azithromycin (ZITHROMAX) 250 mg tablet; Take 500mg on day 1, 250mg on days 2-5  -     predniSONE 20 mg tablet; Take 1 tablet (20 mg total) by mouth daily  -     POCT rapid strepA    Other orders  -     folic acid (FOLVITE) 1 mg tablet;   -     methotrexate 2 5 mg tablet; TAKE 8 TABLETS ONCE A WEEK           Return if symptoms worsen or fail to improve  Subjective:      Patient ID: Theta Lesch is a 52 y o  male  Chief Complaint   Patient presents with    Sore Throat     pt state daughter dx with strep and  pt has had a sore throat since 10/7  af/rma         HPI   Patient started with sore throat 2 days ago and not improving  Daughter was recently diagnosed with strep  Denies fever, chills, sob and swallowing difficulty  Currently not taking any otc  The following portions of the patient's history were reviewed and updated as appropriate: allergies, current medications, past family history, past medical history, past social history, past surgical history and problem list       Review of Systems   Constitutional: Positive for fatigue  Negative for chills and fever  HENT: Positive for sore throat  Negative for congestion, ear discharge, ear pain, facial swelling, hearing loss, mouth sores, nosebleeds, postnasal drip, rhinorrhea, sinus pain, sinus pressure, sneezing, trouble swallowing and voice change  Respiratory: Negative for cough, chest tightness, shortness of breath and wheezing  Cardiovascular: Negative  Gastrointestinal: Negative for abdominal pain, constipation, diarrhea and nausea  Neurological: Negative for dizziness, weakness, light-headedness and headaches  Objective:    History   Smoking Status    Never Smoker   Smokeless Tobacco    Never Used       Allergies: Allergies   Allergen Reactions    Penicillins        Vitals:  /92   Pulse 80   Temp 98 5 °F (36 9 °C)   Resp 20   Ht 6' 2" (1 88 m)   Wt 125 kg (275 lb 6 4 oz)   BMI 35 36 kg/m²     Current Outpatient Prescriptions   Medication Sig Dispense Refill    aspirin (ASPIRIN ADULT LOW DOSE) 81 mg EC tablet Take by mouth daily      enalapril (VASOTEC) 10 mg tablet Take 1 tablet (10 mg total) by mouth daily 90 tablet 1    EPINEPHrine (EPIPEN) 0 3 mg/0 3 mL SOAJ Inject 0 3 mL (0 3 mg total) into the shoulder, thigh, or buttocks once for 1 dose As needed for anaphylaxis, proceed to ER 1 each 0    folic acid (FOLVITE) 1 mg tablet   3    methotrexate 2 5 mg tablet TAKE 8 TABLETS ONCE A WEEK   2    tobramycin-dexamethasone (TOBRADEX) ophthalmic suspension Apply to eye      azithromycin (ZITHROMAX) 250 mg tablet Take 500mg on day 1, 250mg on days 2-5 6 tablet 0    mycophenolate (CELLCEPT) 500 mg tablet Take by mouth      predniSONE 20 mg tablet Take 1 tablet (20 mg total) by mouth daily 7 tablet 0     No current facility-administered medications for this visit  Physical Exam   Constitutional: He is oriented to person, place, and time  He appears well-developed and well-nourished  HENT:   Head: Normocephalic  Right Ear: Tympanic membrane, external ear and ear canal normal    Left Ear: Tympanic membrane, external ear and ear canal normal    Nose: Nose normal  Right sinus exhibits no maxillary sinus tenderness and no frontal sinus tenderness  Left sinus exhibits no maxillary sinus tenderness and no frontal sinus tenderness     Mouth/Throat: Mucous membranes are normal  Posterior oropharyngeal erythema present  Bilateral tonsils are enlarged   Neck: Neck supple  Cardiovascular: Normal rate, regular rhythm and normal heart sounds  Pulmonary/Chest: Effort normal and breath sounds normal    Abdominal: Normal appearance and bowel sounds are normal  There is no hepatosplenomegaly  There is no tenderness  There is no rebound  Musculoskeletal: Normal range of motion  Lymphadenopathy:     He has cervical adenopathy  Right cervical: Superficial cervical adenopathy present  No posterior cervical adenopathy present  Left cervical: Superficial cervical adenopathy present  No posterior cervical adenopathy present  Neurological: He is alert and oriented to person, place, and time  Skin: Skin is warm and dry  Psychiatric: He has a normal mood and affect  His behavior is normal  Judgment and thought content normal    Vitals reviewed          EL Chung

## 2018-10-22 ENCOUNTER — OFFICE VISIT (OUTPATIENT)
Dept: FAMILY MEDICINE CLINIC | Facility: CLINIC | Age: 48
End: 2018-10-22
Payer: COMMERCIAL

## 2018-10-22 VITALS
WEIGHT: 278 LBS | HEART RATE: 84 BPM | SYSTOLIC BLOOD PRESSURE: 120 MMHG | TEMPERATURE: 99 F | DIASTOLIC BLOOD PRESSURE: 82 MMHG | BODY MASS INDEX: 35.68 KG/M2 | RESPIRATION RATE: 18 BRPM | HEIGHT: 74 IN

## 2018-10-22 DIAGNOSIS — H66.92 LEFT OTITIS MEDIA, UNSPECIFIED OTITIS MEDIA TYPE: Primary | ICD-10-CM

## 2018-10-22 PROCEDURE — 99213 OFFICE O/P EST LOW 20 MIN: CPT | Performed by: NURSE PRACTITIONER

## 2018-10-22 RX ORDER — DOXYCYCLINE HYCLATE 100 MG/1
100 CAPSULE ORAL EVERY 12 HOURS SCHEDULED
Qty: 20 CAPSULE | Refills: 0 | Status: SHIPPED | OUTPATIENT
Start: 2018-10-22 | End: 2018-11-01

## 2018-10-22 NOTE — PROGRESS NOTES
Subjective:      Patient ID: Richard Arora is a 52 y o  male  Chief Complaint   Patient presents with   Loyce Mantis     started Saturday    Sinus pressure     ""       HPI  Patient was seen in office on 10/9 for acute pharyngitis and stated that was totally resolved and restarted with sore throat, left ear ache and sinus pressure 2 days ago  Denies fever, chills, sob and chest pain  Not taking any OTC  The following portions of the patient's history were reviewed and updated as appropriate: allergies, current medications, past family history, past medical history, past social history, past surgical history and problem list       Review of Systems   Constitutional: Negative for chills, fatigue and fever  HENT: Positive for ear pain and sore throat  Negative for congestion, ear discharge, facial swelling, hearing loss, mouth sores, nosebleeds, postnasal drip, rhinorrhea, sinus pain, sinus pressure, sneezing, trouble swallowing and voice change  Respiratory: Negative for cough, chest tightness, shortness of breath and wheezing  Cardiovascular: Negative  Gastrointestinal: Negative for abdominal pain, constipation, diarrhea and nausea  Neurological: Negative for dizziness, weakness, light-headedness and headaches  Objective:    History   Smoking Status    Never Smoker   Smokeless Tobacco    Never Used       Allergies:    Allergies   Allergen Reactions    Penicillins        Vitals:  /82   Pulse 84   Temp 99 °F (37 2 °C)   Resp 18   Ht 6' 2" (1 88 m)   Wt 126 kg (278 lb)   BMI 35 69 kg/m²     Current Outpatient Prescriptions   Medication Sig Dispense Refill    aspirin (ASPIRIN ADULT LOW DOSE) 81 mg EC tablet Take by mouth daily      enalapril (VASOTEC) 10 mg tablet Take 1 tablet (10 mg total) by mouth daily 90 tablet 1    folic acid (FOLVITE) 1 mg tablet   3    methotrexate 2 5 mg tablet TAKE 8 TABLETS ONCE A WEEK   2    tobramycin-dexamethasone (TOBRADEX) ophthalmic suspension Apply to eye      doxycycline hyclate (VIBRAMYCIN) 100 mg capsule Take 1 capsule (100 mg total) by mouth every 12 (twelve) hours for 10 days 20 capsule 0    EPINEPHrine (EPIPEN) 0 3 mg/0 3 mL SOAJ Inject 0 3 mL (0 3 mg total) into the shoulder, thigh, or buttocks once for 1 dose As needed for anaphylaxis, proceed to ER 1 each 0     No current facility-administered medications for this visit  Physical Exam   Constitutional: He is oriented to person, place, and time  He appears well-developed and well-nourished  HENT:   Head: Normocephalic  Right Ear: Tympanic membrane, external ear and ear canal normal    Left Ear: External ear and ear canal normal  Tympanic membrane is erythematous and bulging  Nose: Nose normal  Right sinus exhibits no maxillary sinus tenderness and no frontal sinus tenderness  Left sinus exhibits no maxillary sinus tenderness and no frontal sinus tenderness  Mouth/Throat: Oropharynx is clear and moist and mucous membranes are normal    Neck: Neck supple  Cardiovascular: Normal rate, regular rhythm and normal heart sounds  Pulmonary/Chest: Effort normal and breath sounds normal    Abdominal: Normal appearance and bowel sounds are normal  There is no hepatosplenomegaly  There is no tenderness  There is no rebound  Musculoskeletal: Normal range of motion  Lymphadenopathy:        Right cervical: No superficial cervical and no posterior cervical adenopathy present  Left cervical: No superficial cervical and no posterior cervical adenopathy present  Neurological: He is alert and oriented to person, place, and time  Skin: Skin is warm and dry  Psychiatric: He has a normal mood and affect  His behavior is normal  Judgment and thought content normal    Vitals reviewed  Assessment/Plan:  Take medication with food  It is important that you take the entire course of antibiotics prescribed    May also take a probiotic of your choice to maintain healthy GI tani  Can take some probiotic and yogurt with the medication  Supportive care discussed and advised  Follow up for no improvement and worsening of conditions  Patient advised and educated when to see immediate medical care  Diagnoses and all orders for this visit:    Left otitis media, unspecified otitis media type  -     doxycycline hyclate (VIBRAMYCIN) 100 mg capsule; Take 1 capsule (100 mg total) by mouth every 12 (twelve) hours for 10 days    BMI 35 0-35 9,adult          Return if symptoms worsen or fail to improve        EL Gibson

## 2018-10-22 NOTE — PATIENT INSTRUCTIONS
Take medication with food  It is important that you take the entire course of antibiotics prescribed  May also take a probiotic of your choice to maintain healthy GI tani  Can take some probiotic and yogurt with the medication  Supportive care discussed and advised  Follow up for no improvement and worsening of conditions  Patient advised and educated when to see immediate medical care  Doxycycline (By mouth)   Doxycycline ([id number])  Treats and prevents infections  Also used to prevent malaria and treat rosacea or severe acne  This medicine is a tetracycline antibiotic  Brand Name(s): Acticlate, Adoxa, Adoxa Edgard 1/150, Avidoxy, Avidoxy DK, BenzoDox 30 Kit, BenzoDox 60 Kit, Doryx, Doryx MPC, Monodox, Morgidox 6X577BD, Morgidox 0g734NC Kit, Morgidox 1x50MG, Morgidox 1x50MG Kit, Morgidox [id number]   There may be other brand names for this medicine  When This Medicine Should Not Be Used: This medicine is not right for everyone  Do not use it if you had an allergic reaction to doxycycline or another tetracycline antibiotic, or if you are pregnant or breastfeeding  How to Use This Medicine:   Capsule, Delayed Release Capsule, Long Acting Capsule, Liquid, Tablet, Delayed Release Tablet  · Your doctor will tell you how much medicine to use  Do not use more than directed  · Ask your pharmacist or doctor if you need to take this medicine with or without food  Some forms can be taken with food or milk, but others must be taken on an empty stomach  · Tablets: You may take this medicine with food or milk to avoid stomach irritation  To break a tablet, hold the tablet between your thumb and index fingers close to the appropriate scored line  Then, apply enough pressure to snap the tablet segments apart  Do not use the tablet if it does not break on the scored lines  · Delayed-release tablets: You may also take this medicine by sprinkling the broken tablets onto room-temperature applesauce   Swallow this mixture right away; do not chew  Do not store the mixture for later use  · Oracea® capsules: This medicine must be taken on an empty stomach, at least 1 hour before or 2 hours after a meal   · Capsule: Swallow whole  Do not break, crush, chew, or open it  · Oral liquid: Shake the bottle well just before each use  Measure the oral liquid medicine with a marked measuring spoon, oral syringe, or medicine cup  · Take all of the medicine in your prescription to clear up your infection, even if you feel better after the first few doses  · Drink plenty of fluids to avoid throat problems, if you take the capsule or tablet form  · Malaria prevention: Start taking the medicine 1 or 2 days before you travel  Take the medicine every day during your trip  Keep taking it for 4 weeks after you return  However, do not use the medicine for longer than 4 months  · Do not use this medicine for more than 9 months if you are using it for rosacea  · Use only the brand of medicine your doctor prescribed  Other brands may not work the same way  · Read and follow the patient instructions that come with this medicine  Talk to your doctor or pharmacist if you have any questions  · Missed dose: Take a dose as soon as you remember  If it is almost time for your next dose, wait until then and take a regular dose  Do not take extra medicine to make up for a missed dose  · Store the medicine in a closed container at room temperature, away from heat, moisture, and direct light  Do not freeze the oral liquid  Drugs and Foods to Avoid:   Ask your doctor or pharmacist before using any other medicine, including over-the-counter medicines, vitamins, and herbal products  · Some foods and medicines can affect how doxycycline works   Tell your doctor if you are using any of the following:  ¨ Bismuth subsalicylate, isotretinoin or other acne medicines, acitretin or other medicine to treat psoriasis  ¨ Penicillin antibiotic, birth control pills, medicine for seizures (such as carbamazepine, phenobarbital, phenytoin), stomach medicine, a blood thinner (such as warfarin), or any medicine that contains aluminum, calcium, or iron (such an antacid or vitamin supplement)  Warnings While Using This Medicine:   · This medicine may cause birth defects if either partner is using it during conception or pregnancy  Tell your doctor right away if you or your partner becomes pregnant  Birth control pills may not work as well when used with this medicine  Use a second form of birth control to keep from getting pregnant  · Tell your doctor if you have kidney disease, liver disease, asthma, or an allergy to sulfites  Tell your doctor if you had surgery on your stomach, or if you have a history of yeast infections  · This medicine may cause the following problems:  ¨ Permanent change in tooth color (in children younger than 6years old)  ¨ Increased pressure inside the head  ¨ Yeast infection  ¨ Immune system problems  · This medicine can cause diarrhea  Call your doctor if the diarrhea becomes severe, does not stop, or is bloody  Do not take any medicine to stop diarrhea until you have talked to your doctor  Diarrhea can occur 2 months or more after you stop taking this medicine  · This medicine may make your skin more sensitive to sunlight  Wear sunscreen  Do not use sunlamps or tanning beds  · Tell any doctor or dentist who treats you that you are using this medicine  This medicine may affect certain medical test results  · Call your doctor if your symptoms do not improve or if they get worse  · Keep all medicine out of the reach of children  Never share your medicine with anyone    Possible Side Effects While Using This Medicine:   Call your doctor right away if you notice any of these side effects:  · Allergic reaction: Itching or hives, swelling in your face or hands, swelling or tingling in your mouth or throat, chest tightness, trouble breathing  · Blistering, peeling, red skin rash  · Burning, pain, or irritation in your upper stomach or throat  · Diarrhea that may contain blood  · Fever, chills, cough, runny or stuffy nose, sore throat, and body aches  · Joint pain, fever, rash, and unusual tiredness or weakness  · Severe headache, dizziness, or vision changes  If you notice these less serious side effects, talk with your doctor:   · Darkening of your skin, scars, teeth, or gums  · Sores or white patches on your lips, mouth, or throat  If you notice other side effects that you think are caused by this medicine, tell your doctor  Call your doctor for medical advice about side effects  You may report side effects to FDA at 5-019-FDA-1553  © 2017 [employer] Information is for End User's use only and may not be sold, redistributed or otherwise used for commercial purposes  The above information is an  only  It is not intended as medical advice for individual conditions or treatments  Talk to your doctor, nurse or pharmacist before following any medical regimen to see if it is safe and effective for you

## 2018-11-07 LAB
A ALTERNATA IGE QN: <0.1 KU/L
A FUMIGATUS IGE QN: <0.1 KU/L
ALMOND IGE QN: 0.18 KU/L
BERMUDA GRASS IGE QN: 0.73 KU/L
BOXELDER IGE QN: 0.43 KU/L
BRAZIL NUT IGE QN: <0.1 KU/L
C HERBARUM IGE QN: <0.1 KU/L
CASHEW NUT IGE QN: <0.1 KU/L
CAT DANDER IGE QN: 1.64 KU/L
CLAM IGE QN: 0.13 KU/L
CMN PIGWEED IGE QN: 0.2 KU/L
CODFISH IGE QN: <0.1 KU/L
COMMON RAGWEED IGE QN: 0.22 KU/L
CORN IGE QN: 0.18 KU/L
COTTONWOOD IGE QN: 0.26 KU/L
COW MILK IGE QN: 0.42 KU/L
D FARINAE IGE QN: 17.1 KU/L
D PTERONYSS IGE QN: 12.6 KU/L
DOG DANDER IGE QN: 0.45 KU/L
EGG WHITE IGE QN: 0.37 KU/L
HAZELNUT (RCOR A) 1 IGE QN: 2.08 KU/L
HAZELNUT IGE QN: 1.76 KU/L
IGE SERPL-ACNC: 147 IU/ML (ref 0–100)
LONDON PLANE IGE QN: 0.26 KU/L
Lab: <0.1 KU/L
Lab: ABNORMAL
MACADAMIA IGE QN: 0.18 KU/L
MOUSE URINE PROT IGE QN: 0.14 KU/L
MT JUNIPER IGE QN: 0.2 KU/L
MUGWORT IGE QN: 0.17 KU/L
PEANUT (RARA H) 1 IGE QN: <0.1 KU/L
PEANUT (RARA H) 2 IGE QN: <0.1 KU/L
PEANUT (RARA H) 3 IGE QN: <0.1 KU/L
PEANUT (RARA H) 8 IGE QN: 0.12 KU/L
PEANUT (RARA H) 9 IGE QN: <0.1 KU/L
PEANUT IGE QN: 0.2 KU/L
PECAN/HICK NUT IGE QN: <0.1 KU/L
PENICILLIUM CHRYSOGENUM: <0.1 KU/L
PISTACHIO IGE QN: 0.2 KU/L
ROACH IGE QN: 0.2 KU/L
SCALLOP IGE QN: 0.19 KU/L
SESAME SEED IGE QN: 0.28 KU/L
SHEEP SORREL IGE QN: 0.22 KU/L
SHRIMP IGE QN: 0.12 KU/L
SILVER BIRCH IGE QN: 1.98 KU/L
SOYBEAN IGE QN: 0.16 KU/L
TIMOTHY IGE QN: 2.75 KU/L
WALNUT IGE QN: 0.16 KU/L
WALNUT IGE: 0.22 KU/L
WHEAT IGE QN: 0.36 KU/L
WHITE ASH IGE QN: 0.2 KU/L
WHITE ELM IGE QN: 0.24 KU/L
WHITE MULBERRY IGE QN: 0.13 KU/L
WHITE OAK IGE QN: 1.85 KU/L

## 2018-11-12 ENCOUNTER — OFFICE VISIT (OUTPATIENT)
Dept: FAMILY MEDICINE CLINIC | Facility: CLINIC | Age: 48
End: 2018-11-12
Payer: COMMERCIAL

## 2018-11-12 VITALS
DIASTOLIC BLOOD PRESSURE: 80 MMHG | WEIGHT: 280 LBS | HEIGHT: 74 IN | TEMPERATURE: 98.1 F | HEART RATE: 80 BPM | RESPIRATION RATE: 16 BRPM | SYSTOLIC BLOOD PRESSURE: 120 MMHG | BODY MASS INDEX: 35.94 KG/M2

## 2018-11-12 DIAGNOSIS — Z12.5 SCREENING FOR PROSTATE CANCER: ICD-10-CM

## 2018-11-12 DIAGNOSIS — I10 BENIGN ESSENTIAL HYPERTENSION: ICD-10-CM

## 2018-11-12 DIAGNOSIS — E07.9 THYROID DISORDER: ICD-10-CM

## 2018-11-12 DIAGNOSIS — Z12.11 SCREEN FOR COLON CANCER: ICD-10-CM

## 2018-11-12 DIAGNOSIS — H61.22 EXCESSIVE CERUMEN IN LEFT EAR CANAL: ICD-10-CM

## 2018-11-12 DIAGNOSIS — Z00.00 WELL ADULT EXAM: Primary | ICD-10-CM

## 2018-11-12 DIAGNOSIS — R74.8 ELEVATED LIVER ENZYMES: ICD-10-CM

## 2018-11-12 PROCEDURE — 69210 REMOVE IMPACTED EAR WAX UNI: CPT | Performed by: FAMILY MEDICINE

## 2018-11-12 PROCEDURE — 3725F SCREEN DEPRESSION PERFORMED: CPT | Performed by: FAMILY MEDICINE

## 2018-11-12 PROCEDURE — 99396 PREV VISIT EST AGE 40-64: CPT | Performed by: FAMILY MEDICINE

## 2018-11-12 NOTE — PROGRESS NOTES
FAMILY PRACTICE HEALTH MAINTENANCE OFFICE VISIT  [facility]    NAME: Kayla Silva  AGE: 50 y o  SEX: male  : 1970     DATE: 2018    Assessment and Plan     Problem List Items Addressed This Visit     Benign essential hypertension    Relevant Orders    Lipid Panel with Direct LDL reflex    Elevated liver enzymes    Thyroid disorder    Relevant Orders    TSH, 3rd generation      Other Visit Diagnoses     Well adult exam    -  Primary    Screen for colon cancer        Relevant Orders    Ambulatory referral to Gastroenterology    Screening for prostate cancer        Relevant Orders    PSA, ultrasensitive    Excessive cerumen in left ear canal                · Patient Counseling:   · Nutrition: Stressed importance of a well balanced diet, moderation of sodium/saturated fat, caloric balance and sufficient intake of fiber  · Exercise: Stressed the importance of regular exercise with a goal of 150 minutes per week  · Dental Health: Discussed daily flossing and brushing and regular dental visits   · Alcohol Use:  Recommended moderation of alcohol intake    · Immunizations reviewed yes  · Discussed benefits of screening yes  · Discussed the patient's BMI with him  The BMI is above average; BMI management plan is completed     No Follow-up on file  Chief Complaint     Chief Complaint   Patient presents with    Physical Exam    Eye redness     ran out of eye drops       History of Present Illness     Pt is here for a full physical  Pt states he had labs done for DR Cameron Talbert - he ordered a CMP and pt states it was ok    Pt does nopt want flu shot    Pt states his ear feels clogged        Well Adult Physical   Patient here for a comprehensive physical exam       Diet and Physical Activity  Diet: well balanced diet  Weight concerns: Patient has class 2 obesity (BMI 35 0-39  9)  Exercise: infrequently      Depression Screen  PHQ-9 Depression Screening    PHQ-9: Frequency of the following problems over the past two weeks:       Little interest or pleasure in doing things:  0 - not at all  Feeling down, depressed, or hopeless:  0 - not at all  PHQ-2 Score:  0          General Health  Hearing: Normal:  bilateral  Vision: acuity is poor due to eye disease  Dental: regular dental visits    Reproductive Health        The following portions of the patient's history were reviewed and updated as appropriate: allergies, current medications, past family history, past medical history, past social history, past surgical history and problem list     Review of Systems     Review of Systems   Constitutional: Negative for activity change, appetite change, chills, diaphoresis, fatigue, fever and unexpected weight change  HENT: Negative for congestion, dental problem, ear pain, mouth sores, sinus pain, sinus pressure, sore throat and trouble swallowing  Eyes: Negative for photophobia, discharge and itching  Respiratory: Negative for apnea, chest tightness and shortness of breath  Cardiovascular: Negative for chest pain, palpitations and leg swelling  Gastrointestinal: Negative for abdominal distention, abdominal pain, blood in stool, nausea and vomiting  Endocrine: Negative for cold intolerance, heat intolerance, polydipsia, polyphagia and polyuria  Genitourinary: Negative for difficulty urinating  Musculoskeletal: Negative for arthralgias  Skin: Negative for color change and wound  Neurological: Negative for dizziness, syncope, speech difficulty and headaches  Hematological: Negative for adenopathy  Psychiatric/Behavioral: Negative for agitation and behavioral problems         Past Medical History     Past Medical History:   Diagnosis Date    Allergic reaction     last assessed: 02/21/13    Lymphadenopathy     last assessed: 12/29/14    Obesity, Class II, BMI 35-39 9     last assessed: 10/24/16    Suppurative lymphadenitis 11/20/2008       Past Surgical History     Past Surgical History:   Procedure Laterality Date    KNEE SURGERY         Social History     Social History     Social History    Marital status: /Civil Union     Spouse name: N/A    Number of children: N/A    Years of education: N/A     Social History Main Topics    Smoking status: Never Smoker    Smokeless tobacco: Never Used    Alcohol use None    Drug use: Unknown    Sexual activity: Not Asked     Other Topics Concern    None     Social History Narrative    None       Family History     Family History   Problem Relation Age of Onset    Other Father         malignant neoplasm of prostate    Prostate cancer Father     Mental illness Neg Hx        Current Medications       Current Outpatient Prescriptions:     aspirin (ASPIRIN ADULT LOW DOSE) 81 mg EC tablet, Take by mouth daily, Disp: , Rfl:     enalapril (VASOTEC) 10 mg tablet, Take 1 tablet (10 mg total) by mouth daily, Disp: 90 tablet, Rfl: 1    folic acid (FOLVITE) 1 mg tablet, , Disp: , Rfl: 3    methotrexate 2 5 mg tablet, TAKE 8 TABLETS ONCE A WEEK , Disp: , Rfl: 2    tobramycin-dexamethasone (TOBRADEX) ophthalmic suspension, Apply to eye, Disp: , Rfl:     EPINEPHrine (EPIPEN) 0 3 mg/0 3 mL SOAJ, Inject 0 3 mL (0 3 mg total) into the shoulder, thigh, or buttocks once for 1 dose As needed for anaphylaxis, proceed to ER, Disp: 1 each, Rfl: 0     Allergies     Allergies   Allergen Reactions    Penicillins        Objective     /80   Pulse 80   Temp 98 1 °F (36 7 °C)   Resp 16   Ht 6' 2" (1 88 m)   Wt 127 kg (280 lb)   BMI 35 95 kg/m²      Physical Exam   Constitutional: He appears well-developed and well-nourished  No distress  HENT:   Head: Normocephalic and atraumatic  Right Ear: External ear normal    Left Ear: External ear normal    Nose: Nose normal    Mouth/Throat: Oropharynx is clear and moist  No oropharyngeal exudate  Left canal with wax   Eyes: Pupils are equal, round, and reactive to light  EOM are normal  Right eye exhibits no discharge  Left eye exhibits no discharge  No scleral icterus  Neck: No thyromegaly present  Cardiovascular: Normal rate and normal heart sounds  No murmur heard  Pulmonary/Chest: Effort normal and breath sounds normal  No respiratory distress  He has no wheezes  Abdominal: Soft  Bowel sounds are normal  He exhibits no distension and no mass  There is no tenderness  There is no rebound and no guarding  Musculoskeletal: Normal range of motion  Neurological: He is alert  He displays normal reflexes  Coordination normal    Skin: Skin is warm and dry  No rash noted  He is not diaphoretic  No erythema  Psychiatric: He has a normal mood and affect  His behavior is normal    Nursing note and vitals reviewed  Visual Acuity Screening    Right eye Left eye Both eyes   Without correction: 20/30 20/40 20/40   With correction:          Health Maintenance     Health Maintenance   Topic Date Due    Pneumococcal PPSV23 Highest Risk Adult (1 of 3 - PCV13) 11/06/1989    DTaP,Tdap,and Td Vaccines (1 - Tdap) 11/06/1991    INFLUENZA VACCINE  07/01/2018    Depression Screening PHQ  03/13/2019     Immunization History   Administered Date(s) Administered    H1N1, All Formulations 02/25/2010    Influenza TIV (IM) 02/25/2010     Ear cerumen removal  Date/Time: 11/12/2018 9:25 AM  Performed by: Tony Simmons by: Rayshawn Reid     Patient location:  Clinic  Indications / Diagnosis:  Ceruminosis  Other Assisting Provider: No    Consent:     Consent obtained:  Verbal    Consent given by:  Patient    Risks discussed:  Bleeding and dizziness    Alternatives discussed:  No treatment  Universal protocol:     Procedure explained and questions answered to patient or proxy's satisfaction: yes      Relevant documents present and verified: yes      Test results available and properly labeled: no      Radiology Images displayed and confirmed    If images not available, report reviewed: no      Required blood products, implants, devices and special equipment available: no      Site/side marked: yes      Immediately prior to procedure a time out was called: yes      Patient identity confirmed:  Verbally with patient  Procedure details:     Location:  L ear    Procedure type: curette      Procedure type comment:  W irrigation    Approach:  Natural orifice  Post-procedure details:     Complication:  None    Hearing quality:  Normal    Patient tolerance of procedure:   Tolerated well, no immediate complications  Comments:      No complications with procedure pt did well          Shon Saleh, [address]

## 2018-11-15 ENCOUNTER — OFFICE VISIT (OUTPATIENT)
Dept: FAMILY MEDICINE CLINIC | Facility: CLINIC | Age: 48
End: 2018-11-15
Payer: COMMERCIAL

## 2018-11-15 VITALS
HEIGHT: 74 IN | HEART RATE: 74 BPM | BODY MASS INDEX: 35.91 KG/M2 | DIASTOLIC BLOOD PRESSURE: 86 MMHG | SYSTOLIC BLOOD PRESSURE: 142 MMHG | WEIGHT: 279.8 LBS | TEMPERATURE: 98.5 F

## 2018-11-15 DIAGNOSIS — H92.02 OTALGIA OF LEFT EAR: ICD-10-CM

## 2018-11-15 DIAGNOSIS — J02.0 ACUTE STREPTOCOCCAL PHARYNGITIS: Primary | ICD-10-CM

## 2018-11-15 LAB — S PYO AG THROAT QL: POSITIVE

## 2018-11-15 PROCEDURE — 99213 OFFICE O/P EST LOW 20 MIN: CPT | Performed by: NURSE PRACTITIONER

## 2018-11-15 PROCEDURE — 1036F TOBACCO NON-USER: CPT | Performed by: NURSE PRACTITIONER

## 2018-11-15 PROCEDURE — 3008F BODY MASS INDEX DOCD: CPT | Performed by: NURSE PRACTITIONER

## 2018-11-15 PROCEDURE — 87880 STREP A ASSAY W/OPTIC: CPT | Performed by: NURSE PRACTITIONER

## 2018-11-15 RX ORDER — SULFAMETHOXAZOLE AND TRIMETHOPRIM 800; 160 MG/1; MG/1
1 TABLET ORAL EVERY 12 HOURS SCHEDULED
Qty: 14 TABLET | Refills: 0 | Status: CANCELLED | OUTPATIENT
Start: 2018-11-15 | End: 2018-11-22

## 2018-11-15 NOTE — LETTER
November 15, 2018     Patient: Lolis Cruz   YOB: 1970   Date of Visit: 11/15/2018       To Whom it May Concern:    Slick Crouch is under my professional care  He was seen in my office on 11/15/2018  He may return to work on 11/19/2018  If you have any questions or concerns, please don't hesitate to call           Sincerely,          EL Ji        CC: No Recipients

## 2018-11-15 NOTE — PROGRESS NOTES
Subjective:      Patient ID: Florentino Kaplan is a 50 y o  male  Chief Complaint   Patient presents with    Sore Throat     x3 days  [facility] lpn       HPI  Patient stated that having sore throat from 3 days and getting worse  Denies fever, chills and sob  Stated that his left ear is better after dr Leonila Gaspar did irrigation at next visit, but still having discomfort on and off but no hearing issues  Currently not taking any medications for symptoms  The following portions of the patient's history were reviewed and updated as appropriate: allergies, current medications, past family history, past medical history, past social history, past surgical history and problem list       Review of Systems   Constitutional: Negative for chills, fatigue and fever  HENT: Positive for sore throat  Negative for congestion, ear discharge, ear pain, facial swelling, hearing loss, mouth sores, nosebleeds, postnasal drip, rhinorrhea, sinus pain, sinus pressure, sneezing, trouble swallowing and voice change  Respiratory: Negative for cough, chest tightness, shortness of breath and wheezing  Cardiovascular: Negative  Gastrointestinal: Negative for abdominal pain, constipation, diarrhea and nausea  Neurological: Negative for dizziness, weakness, light-headedness and headaches  Objective:    History   Smoking Status    Never Smoker   Smokeless Tobacco    Never Used       Allergies:    Allergies   Allergen Reactions    Penicillins        Vitals:  /86   Pulse 74   Temp 98 5 °F (36 9 °C)   Ht 6' 2" (1 88 m)   Wt 127 kg (279 lb 12 8 oz)   BMI 35 92 kg/m²     Current Outpatient Prescriptions   Medication Sig Dispense Refill    aspirin (ASPIRIN ADULT LOW DOSE) 81 mg EC tablet Take by mouth daily      enalapril (VASOTEC) 10 mg tablet Take 1 tablet (10 mg total) by mouth daily 90 tablet 1    EPINEPHrine (EPIPEN) 0 3 mg/0 3 mL SOAJ Inject 0 3 mL (0 3 mg total) into the shoulder, thigh, or buttocks once for 1 dose As needed for anaphylaxis, proceed to ER 1 each 0    folic acid (FOLVITE) 1 mg tablet   3    methotrexate 2 5 mg tablet TAKE 8 TABLETS ONCE A WEEK   2    tobramycin-dexamethasone (TOBRADEX) ophthalmic suspension Apply to eye      clarithromycin (BIAXIN XL) 500 MG 24 hr tablet Take 2 tablets (1,000 mg total) by mouth daily for 10 days 20 tablet 0     No current facility-administered medications for this visit  Physical Exam   Constitutional: He is oriented to person, place, and time  He appears well-developed and well-nourished  HENT:   Head: Normocephalic  Right Ear: Tympanic membrane, external ear and ear canal normal    Left Ear: Tympanic membrane, external ear and ear canal normal    Nose: Nose normal  Right sinus exhibits no maxillary sinus tenderness and no frontal sinus tenderness  Left sinus exhibits no maxillary sinus tenderness and no frontal sinus tenderness  Mouth/Throat: Mucous membranes are normal  Posterior oropharyngeal erythema present  Neck: Neck supple  Cardiovascular: Normal rate, regular rhythm and normal heart sounds  Pulmonary/Chest: Effort normal and breath sounds normal    Abdominal: Normal appearance and bowel sounds are normal  There is no hepatosplenomegaly  There is no tenderness  There is no rebound  Musculoskeletal: Normal range of motion  Lymphadenopathy:     He has cervical adenopathy  Right cervical: Superficial cervical adenopathy present  No posterior cervical adenopathy present  Left cervical: Superficial cervical adenopathy present  No posterior cervical adenopathy present  Neurological: He is alert and oriented to person, place, and time  Skin: Skin is warm and dry  Psychiatric: He has a normal mood and affect  His behavior is normal  Judgment and thought content normal    Vitals reviewed            Recent Results (from the past 24 hour(s))   POCT rapid strepA    Collection Time: 11/15/18  3:18 PM   Result Value Ref Range     RAPID STREP A Positive (A) Negative         Assessment/Plan:         Diagnoses and all orders for this visit:    Acute streptococcal pharyngitis  -     clarithromycin (BIAXIN XL) 500 MG 24 hr tablet; Take 2 tablets (1,000 mg total) by mouth daily for 10 days  -     POCT rapid strepA    Otalgia of left ear  -     Ambulatory Referral to Otolaryngology; Future    BMI 35 0-35 9,adult    Other orders  -     Cancel: sulfamethoxazole-trimethoprim (BACTRIM DS) 800-160 mg per tablet; Take 1 tablet by mouth every 12 (twelve) hours for 7 days            Patient Instructions: Take medication with food  It is important that you take the entire course of antibiotics prescribed  May also take a probiotic of your choice to maintain healthy GI tani  Can take some probiotic and yogurt with the medication  Gargle with warm salt water for 5 minutes every 4 hours  Drink plenty of fluids at least 6 glasses of water a day  Can use some honey lemon tea  Call or follow up if symptoms are not better in 7 days  Supportive care discussed and advised  Follow up for no improvement and worsening of conditions  Patient advised and educated when to see immediate medical care  Return if symptoms worsen or fail to improve        EL Mast

## 2018-11-15 NOTE — PATIENT INSTRUCTIONS
Take medication with food  It is important that you take the entire course of antibiotics prescribed  May also take a probiotic of your choice to maintain healthy GI tani  Can take some probiotic and yogurt with the medication  Gargle with warm salt water for 5 minutes every 4 hours  Drink plenty of fluids at least 6 glasses of water a day  Can use some honey lemon tea  Call or follow up if symptoms are not better in 7 days  Supportive care discussed and advised  Follow up for no improvement and worsening of conditions  Patient advised and educated when to see immediate medical care  Strep Throat   WHAT YOU NEED TO KNOW:   Strep throat is a throat infection caused by bacteria  It is easily spread from person to person  DISCHARGE INSTRUCTIONS:   Call 911 for any of the following:   · You have trouble breathing  Return to the emergency department if:   · You have new symptoms like a bad headache, stiff neck, chest pain, or vomiting  · You are drooling because you cannot swallow your spit  Contact your healthcare provider if:   · You have a fever  · You have a rash or ear pain  · You have green, yellow-brown, or bloody mucus when you cough or blow your nose  · You are unable to drink anything  · You have questions or concerns about your condition or care  Medicines:   · Antibiotics  help treat your strep throat  You should feel better within 2 to 3 days after you start antibiotics  · Take your medicine as directed  Contact your healthcare provider if you think your medicine is not helping or if you have side effects  Tell him or her if you are allergic to any medicine  Keep a list of the medicines, vitamins, and herbs you take  Include the amounts, and when and why you take them  Bring the list or the pill bottles to follow-up visits  Carry your medicine list with you in case of an emergency    Manage your symptoms:   · Use lozenges, ice, soft foods, or popsicles  to soothe your throat  · Drink juice, milk shakes, or soup  if your throat is too sore to eat solid food  Drinking liquids can also help prevent dehydration  · Gargle with salt water  Mix ¼ teaspoon salt in a glass of warm water and gargle  This may help reduce swelling in your throat  · Do not smoke  Nicotine and other chemicals in cigarettes and cigars can cause lung damage and make your symptoms worse  Ask your healthcare provider for information if you currently smoke and need help to quit  E-cigarettes or smokeless tobacco still contain nicotine  Talk to your healthcare provider before you use these products  Return to work or school  24 hours after you start antibiotic medicine  Prevent the spread of strep throat:   · Wash your hands often  Use soap and water  Wash your hands after you use the bathroom, change a child's diapers, or sneeze  Wash your hands before you prepare or eat food  · Do not share food or drinks  Replace your toothbrush after you have taken antibiotics for 24 hours  Follow up with your healthcare provider as directed:  Write down your questions so you remember to ask them during your visits  © 2017 [employer] Information is for End User's use only and may not be sold, redistributed or otherwise used for commercial purposes  All illustrations and images included in CareNotes® are the copyrighted property of [employer]  or Deon Noriega  The above information is an  only  It is not intended as medical advice for individual conditions or treatments  Talk to your doctor, nurse or pharmacist before following any medical regimen to see if it is safe and effective for you

## 2018-12-06 ENCOUNTER — OFFICE VISIT (OUTPATIENT)
Dept: OTOLARYNGOLOGY | Facility: CLINIC | Age: 48
End: 2018-12-06
Payer: COMMERCIAL

## 2018-12-06 VITALS
DIASTOLIC BLOOD PRESSURE: 80 MMHG | HEIGHT: 74 IN | SYSTOLIC BLOOD PRESSURE: 128 MMHG | BODY MASS INDEX: 35.55 KG/M2 | WEIGHT: 277 LBS

## 2018-12-06 DIAGNOSIS — J31.0 RHINITIS MEDICAMENTOSA: ICD-10-CM

## 2018-12-06 DIAGNOSIS — J34.3 NASAL TURBINATE HYPERTROPHY: ICD-10-CM

## 2018-12-06 DIAGNOSIS — J34.2 NASAL SEPTAL DEVIATION: ICD-10-CM

## 2018-12-06 DIAGNOSIS — J30.9 ALLERGIC RHINITIS, UNSPECIFIED SEASONALITY, UNSPECIFIED TRIGGER: ICD-10-CM

## 2018-12-06 DIAGNOSIS — H69.82 ETD (EUSTACHIAN TUBE DYSFUNCTION), LEFT: Primary | ICD-10-CM

## 2018-12-06 DIAGNOSIS — T48.5X5A RHINITIS MEDICAMENTOSA: ICD-10-CM

## 2018-12-06 PROCEDURE — 99243 OFF/OP CNSLTJ NEW/EST LOW 30: CPT | Performed by: OTOLARYNGOLOGY

## 2018-12-06 NOTE — LETTER
December 6, 2018     Sissy Jin DO  [address] [id number]    Patient: Juan Antonio Singleton   YOB: 1970   Date of Visit: 12/6/2018       Dear Dr Darío Arrieta:    Thank you for referring Florencio Mota to me for evaluation  Below are my notes for this consultation  If you have questions, please do not hesitate to call me  I look forward to following your patient along with you  Sincerely,        Lopez Vazquez MD        CC: No Recipients  Lopez Vazquez MD  12/6/2018 10:03 AM  Sign at close encounter  [facility] Otolaryngology New Patient Visit    Juan Antonio Singleton is a 50 y o  who presents with a chief complaint of clogged left ear    Pertinent elements of the history include:  Symptoms began with a URI 6 weeks ago  He has had associated left sided aural fullness during this period  His sinus symptoms/nasal congestion has improved over the past several days  He is able to pop his ear, but only temporarily  When blocked, he has associated pulsatile tinnitus  He has been using Afrin for decongestion  He has also completed a Zpack, Doxycline and a third antibiotic, most recently for a Strep positive pharyngitis about 3 weeks ago  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  Aural fullness also causes hearing loss  Denies non-pulsatile tinnitus  Compounding his symptoms, he has a lifelong history of allergies  He does not currently take any antihistamines  He has a longstanding history of nasal obstruction as a result of his allergies  He has been using Afrin 2 times per day for the past 3 weeks for his congestion  He also notes that he is sensitive to high frequency sounds  Review of systems 10 point review of systems reviewed as documented in the intake form, scanned into the medical record under the media tab  Results reviewed; images from any scan have been personally reviewed:         The past medical, surgical, social and family history have been reviewed as documented in today's record  Physical exam: (abnormal findings appear in bold and supercede any conflicting normal findings listed below)    /80 (BP Location: Left arm, Patient Position: Sitting, Cuff Size: Large)   Ht 6' 2" (1 88 m)   Wt 126 kg (277 lb)   BMI 35 56 kg/m²      Constitutional:  Well developed, well nourished and groomed, in no acute distress  Eyes:  Extra-ocular movements intact, pupils equally round and reactive to light and accommodation, bilateral chemosis (from his pemphigus)    Head: Atraumatic, normocephalic, no visible scalp lesions, bony palpation unremarkable without stepoffs, parotid and submandibular salivary glands non-tender to palpation and without masses bilaterally  Ears:  Auricles normal in appearance bilaterally, mastoid prominence non-tender, external auditory canals clear bilaterally, tympanic membranes intact bilaterally without evidence of middle ear effusion or masses, normal appearing ossicles  Right TM moves with autoinsufflation, left does not  Left TM erythema  Nose/Sinuses:  External appearance unremarkable, no maxillary or frontal sinus tenderness to palpation bilaterally  Anterior rhinoscopy reveals: bilateral severe mucosal edema, left septal deviation, turbinate hypertrophy  Oral Cavity:  Moist mucus membranes, gums and dentition unremarkable, no oral mucosal masses or lesions, floor of mouth soft, tongue mobile without masses or lesions  Oropharynx:  Base of tongue soft and without masses, tonsils bilaterally unremarkable, soft palate mucosa unremarkable, laryngeal mirror exam unrevealing  Neck:  No visible or palpable cervical lesions or lymphadenopathy, thyroid gland is normal in size and symmetry and without masses, normal laryngeal elevation with swallowing  Cardiovascular:  Normal rate and rhythm, no palpable thrills, no jugulovenous distension observed    Respiratory:  Normal respiratory effort without evidence of retractions or use of accessory muscles  Integument:  Normal appearing without observed masses or lesions  Neurologic:  Cranial nerves II-XII intact bilaterally  Psychiatric:  Alert and oriented to time, place and person, normal affect  Procedures      Assessment:   1  ETD (Eustachian tube dysfunction), left  Audiogram screen   2  Nasal septal deviation     3  Nasal turbinate hypertrophy     4  Rhinitis medicamentosa     5  Allergic rhinitis, unspecified seasonality, unspecified trigger         Orders  Orders Placed This Encounter   Procedures    Audiogram screen     Standing Status:   Future     Standing Expiration Date:   12/6/2019         Discussion/Plan:    1  He elected to obtain an audiogram at a [facility] facility for cost reasons  In the meantime I recommended that he stop Afrin and start Flonase  He will follow up for a no cost no charge visit to review his audiogram after it is obtained  Thank you for allowing me to participate in the care of your patient

## 2018-12-06 NOTE — PROGRESS NOTES
[facility] New Patient Visit    Purnima Murphy is a 50 y o  who presents with a chief complaint of clogged left ear    Pertinent elements of the history include:  Symptoms began with a URI 6 weeks ago  He has had associated left sided aural fullness during this period  His sinus symptoms/nasal congestion has improved over the past several days  He is able to pop his ear, but only temporarily  When blocked, he has associated pulsatile tinnitus  He has been using Afrin for decongestion  He has also completed a Zpack, Doxycline and a third antibiotic, most recently for a Strep positive pharyngitis about 3 weeks ago  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  Aural fullness also causes hearing loss  Denies non-pulsatile tinnitus  Compounding his symptoms, he has a lifelong history of allergies  He does not currently take any antihistamines  He has a longstanding history of nasal obstruction as a result of his allergies  He has been using Afrin 2 times per day for the past 3 weeks for his congestion  He also notes that he is sensitive to high frequency sounds  Review of systems 10 point review of systems reviewed as documented in the intake form, scanned into the medical record under the media tab  Results reviewed; images from any scan have been personally reviewed: The past medical, surgical, social and family history have been reviewed as documented in today's record  Physical exam: (abnormal findings appear in bold and supercede any conflicting normal findings listed below)    /80 (BP Location: Left arm, Patient Position: Sitting, Cuff Size: Large)   Ht 6' 2" (1 88 m)   Wt 126 kg (277 lb)   BMI 35 56 kg/m²     Constitutional:  Well developed, well nourished and groomed, in no acute distress       Eyes:  Extra-ocular movements intact, pupils equally round and reactive to light and accommodation, bilateral chemosis (from his pemphigus)    Head: Atraumatic, normocephalic, no visible scalp lesions, bony palpation unremarkable without stepoffs, parotid and submandibular salivary glands non-tender to palpation and without masses bilaterally  Ears:  Auricles normal in appearance bilaterally, mastoid prominence non-tender, external auditory canals clear bilaterally, tympanic membranes intact bilaterally without evidence of middle ear effusion or masses, normal appearing ossicles  Right TM moves with autoinsufflation, left does not  Left TM erythema  Nose/Sinuses:  External appearance unremarkable, no maxillary or frontal sinus tenderness to palpation bilaterally  Anterior rhinoscopy reveals: bilateral severe mucosal edema, left septal deviation, turbinate hypertrophy  Oral Cavity:  Moist mucus membranes, gums and dentition unremarkable, no oral mucosal masses or lesions, floor of mouth soft, tongue mobile without masses or lesions  Oropharynx:  Base of tongue soft and without masses, tonsils bilaterally unremarkable, soft palate mucosa unremarkable, laryngeal mirror exam unrevealing  Neck:  No visible or palpable cervical lesions or lymphadenopathy, thyroid gland is normal in size and symmetry and without masses, normal laryngeal elevation with swallowing  Cardiovascular:  Normal rate and rhythm, no palpable thrills, no jugulovenous distension observed  Respiratory:  Normal respiratory effort without evidence of retractions or use of accessory muscles  Integument:  Normal appearing without observed masses or lesions  Neurologic:  Cranial nerves II-XII intact bilaterally  Psychiatric:  Alert and oriented to time, place and person, normal affect  Procedures      Assessment:   1  ETD (Eustachian tube dysfunction), left  Audiogram screen   2  Nasal septal deviation     3  Nasal turbinate hypertrophy     4  Rhinitis medicamentosa     5   Allergic rhinitis, unspecified seasonality, unspecified trigger         Orders  Orders Placed This Encounter   Procedures    Audiogram screen     Standing Status:   Future     Standing Expiration Date:   12/6/2019         Discussion/Plan:    1  He elected to obtain an audiogram at a [facility] to facility for cost reasons  In the meantime I recommended that he stop Afrin and start Flonase  He will follow up for a no cost no charge visit to review his audiogram after it is obtained  Thank you for allowing me to participate in the care of your patient

## 2019-01-31 DIAGNOSIS — I10 BENIGN ESSENTIAL HYPERTENSION: ICD-10-CM

## 2019-01-31 RX ORDER — ENALAPRIL MALEATE 10 MG/1
10 TABLET ORAL DAILY
Qty: 90 TABLET | Refills: 1 | Status: SHIPPED | OUTPATIENT
Start: 2019-01-31 | End: 2019-07-18 | Stop reason: SDUPTHER

## 2019-05-13 ENCOUNTER — OFFICE VISIT (OUTPATIENT)
Dept: FAMILY MEDICINE CLINIC | Facility: CLINIC | Age: 49
End: 2019-05-13
Payer: COMMERCIAL

## 2019-05-13 VITALS
RESPIRATION RATE: 16 BRPM | HEIGHT: 74 IN | WEIGHT: 275 LBS | HEART RATE: 76 BPM | SYSTOLIC BLOOD PRESSURE: 118 MMHG | TEMPERATURE: 99.3 F | DIASTOLIC BLOOD PRESSURE: 80 MMHG | BODY MASS INDEX: 35.29 KG/M2

## 2019-05-13 DIAGNOSIS — J02.9 ACUTE PHARYNGITIS, UNSPECIFIED ETIOLOGY: Primary | ICD-10-CM

## 2019-05-13 LAB — S PYO AG THROAT QL: NEGATIVE

## 2019-05-13 PROCEDURE — 87880 STREP A ASSAY W/OPTIC: CPT | Performed by: NURSE PRACTITIONER

## 2019-05-13 PROCEDURE — 3725F SCREEN DEPRESSION PERFORMED: CPT | Performed by: NURSE PRACTITIONER

## 2019-05-13 PROCEDURE — 99213 OFFICE O/P EST LOW 20 MIN: CPT | Performed by: NURSE PRACTITIONER

## 2019-05-13 RX ORDER — AZITHROMYCIN 250 MG/1
TABLET, FILM COATED ORAL
Qty: 6 TABLET | Refills: 0 | Status: SHIPPED | OUTPATIENT
Start: 2019-05-13 | End: 2019-05-18

## 2019-05-13 RX ORDER — PREDNISONE 20 MG/1
TABLET ORAL
Refills: 2 | COMMUNITY
Start: 2019-03-11 | End: 2019-06-29 | Stop reason: ALTCHOICE

## 2019-06-29 ENCOUNTER — OFFICE VISIT (OUTPATIENT)
Dept: FAMILY MEDICINE CLINIC | Facility: CLINIC | Age: 49
End: 2019-06-29
Payer: COMMERCIAL

## 2019-06-29 VITALS
BODY MASS INDEX: 35.94 KG/M2 | SYSTOLIC BLOOD PRESSURE: 124 MMHG | TEMPERATURE: 98 F | WEIGHT: 280 LBS | DIASTOLIC BLOOD PRESSURE: 86 MMHG | RESPIRATION RATE: 18 BRPM | HEART RATE: 72 BPM | HEIGHT: 74 IN

## 2019-06-29 DIAGNOSIS — I10 BENIGN ESSENTIAL HYPERTENSION: ICD-10-CM

## 2019-06-29 DIAGNOSIS — J01.00 ACUTE NON-RECURRENT MAXILLARY SINUSITIS: Primary | ICD-10-CM

## 2019-06-29 DIAGNOSIS — L12.1 OCP (OCULAR CICATRICIAL PEMPHIGOID): ICD-10-CM

## 2019-06-29 PROCEDURE — 1036F TOBACCO NON-USER: CPT | Performed by: FAMILY MEDICINE

## 2019-06-29 PROCEDURE — 3079F DIAST BP 80-89 MM HG: CPT | Performed by: FAMILY MEDICINE

## 2019-06-29 PROCEDURE — 3074F SYST BP LT 130 MM HG: CPT | Performed by: FAMILY MEDICINE

## 2019-06-29 PROCEDURE — 3008F BODY MASS INDEX DOCD: CPT | Performed by: FAMILY MEDICINE

## 2019-06-29 PROCEDURE — 99214 OFFICE O/P EST MOD 30 MIN: CPT | Performed by: FAMILY MEDICINE

## 2019-06-29 RX ORDER — CLARITHROMYCIN 500 MG/1
500 TABLET, COATED ORAL 2 TIMES DAILY
Qty: 20 TABLET | Refills: 0 | Status: SHIPPED | OUTPATIENT
Start: 2019-06-29 | End: 2019-07-08

## 2019-07-08 ENCOUNTER — OFFICE VISIT (OUTPATIENT)
Dept: FAMILY MEDICINE CLINIC | Facility: CLINIC | Age: 49
End: 2019-07-08
Payer: COMMERCIAL

## 2019-07-08 VITALS
HEIGHT: 74 IN | DIASTOLIC BLOOD PRESSURE: 84 MMHG | HEART RATE: 74 BPM | BODY MASS INDEX: 35.81 KG/M2 | TEMPERATURE: 99 F | WEIGHT: 279 LBS | RESPIRATION RATE: 18 BRPM | SYSTOLIC BLOOD PRESSURE: 132 MMHG

## 2019-07-08 DIAGNOSIS — Z01.818 PREOP EXAMINATION: Primary | ICD-10-CM

## 2019-07-08 DIAGNOSIS — I10 BENIGN ESSENTIAL HYPERTENSION: ICD-10-CM

## 2019-07-08 DIAGNOSIS — H26.33 CATARACT OF BOTH EYES DUE TO DRUG: ICD-10-CM

## 2019-07-08 PROBLEM — J01.00 ACUTE NON-RECURRENT MAXILLARY SINUSITIS: Status: RESOLVED | Noted: 2019-06-29 | Resolved: 2019-07-08

## 2019-07-08 PROCEDURE — 93000 ELECTROCARDIOGRAM COMPLETE: CPT | Performed by: FAMILY MEDICINE

## 2019-07-08 PROCEDURE — 99244 OFF/OP CNSLTJ NEW/EST MOD 40: CPT | Performed by: FAMILY MEDICINE

## 2019-07-08 RX ORDER — PREDNISONE 10 MG/1
TABLET ORAL
Refills: 0 | COMMUNITY
Start: 2019-07-01 | End: 2019-11-13 | Stop reason: ALTCHOICE

## 2019-07-08 NOTE — PROGRESS NOTES
FAMILY [facility] PRE-OPERATIVE EVALUATION  [facility]    NAME: Maged Mathis  AGE: 50 y o  SEX: male  : 1970     DATE: 2019    Family Practice Pre-Operative Evaluation      Chief Complaint: Pre-operative Evaluation     Surgery: Cataract B/L  Anticipated Date of Surgery: Saira Restrepo,  - rt eye 19 - l eye  Surgeon: DR Nimco Glover      History of Present Illness:     Pt is here for a pre op examination for cataract surgery   B/L  Pt has not had any Pre op testing done  Pt had a CBC/CMP on his phone from May 31st were acceptable    Anesthesia:  local and general  Bleeding Risk: no recent abnormal bleeding, no remote history of abnormal bleeding and use of Ca-channel blockers (see med list)  Current Anti-platelet/anticoagulation medication: Aspirin    Assessment of Cardiac Risk:  · Denies unstable or severe angina or MI in the last 6 weeks or history of stent placement in the last year   · Denies decompensated heart failure (e g  New onset heart failure, NYHA functional class IV heart failure, or worsening existing heart failure)  · Denies significant arrhythmias such as high grade AV block, symptomatic ventricular arrhythmia, newly recognized ventricular tachycardia, supraventricular tachycardia with resting heart rate >100, or symptomatic bradycardia  · Denies severe heart valve disease including aortic stenosis or symptomatic mitral stenosis     Exercise Capacity:  · Able to walk 4 blocks without symptoms?: Yes  · Able to walk 2 flights without symptoms?: Yes    Prior Anesthesia Reactions: No     Personal history of venous thromboembolic disease? No    History of steroid use for >2 weeks within last year? Yes         Review of Systems:     Review of Systems   Constitutional: Negative for activity change, appetite change, chills, diaphoresis, fatigue, fever and unexpected weight change     HENT: Negative for congestion, dental problem, ear pain, mouth sores, sinus pressure, sinus pain, sore throat and trouble swallowing  Eyes: Negative for photophobia, discharge and itching  Respiratory: Negative for apnea, chest tightness and shortness of breath  Cardiovascular: Negative for chest pain, palpitations and leg swelling  Gastrointestinal: Negative for abdominal distention, abdominal pain, blood in stool, nausea and vomiting  Endocrine: Negative for cold intolerance, heat intolerance, polydipsia, polyphagia and polyuria  Genitourinary: Negative for difficulty urinating  Musculoskeletal: Negative for arthralgias  Skin: Negative for color change and wound  Neurological: Negative for dizziness, syncope, speech difficulty and headaches  Hematological: Negative for adenopathy  Psychiatric/Behavioral: Negative for agitation and behavioral problems  Current Problem List:     Patient Active Problem List   Diagnosis    Snow esophagus    Benign essential hypertension    Elevated liver enzymes    High triglycerides    Thyroid disorder    TIA (transient ischemic attack)    Abnormal echocardiogram    OCP (ocular cicatricial pemphigoid)       Allergies:      Allergies   Allergen Reactions    Penicillins        Current Medications:       Current Outpatient Medications:     aspirin (ASPIRIN ADULT LOW DOSE) 81 mg EC tablet, Take by mouth daily, Disp: , Rfl:     enalapril (VASOTEC) 10 mg tablet, Take 1 tablet (10 mg total) by mouth daily, Disp: 90 tablet, Rfl: 1    EPINEPHrine (EPIPEN) 0 3 mg/0 3 mL SOAJ, Inject 0 3 mL (0 3 mg total) into the shoulder, thigh, or buttocks once for 1 dose As needed for anaphylaxis, proceed to ER, Disp: 1 each, Rfl: 0    folic acid (FOLVITE) 1 mg tablet, , Disp: , Rfl: 3    methotrexate 2 5 mg tablet, TAKE 10 TABLETS ONCE A WEEK, Disp: , Rfl: 2    predniSONE 10 mg tablet, , Disp: , Rfl: 0    tobramycin-dexamethasone (TOBRADEX) ophthalmic suspension, Apply to eye, Disp: , Rfl:     Past Medical History:       Past Medical History:   Diagnosis Date    Allergic reaction     last assessed: 02/21/13    Environmental allergies     GERD (gastroesophageal reflux disease)     High blood pressure     Lymphadenopathy     last assessed: 12/29/14    Obesity, Class II, BMI 35-39 9     last assessed: 10/24/16    OCP (ocular cicatricial pemphigoid)     Suppurative lymphadenitis 11/20/2008        Past Surgical History:   Procedure Laterality Date    KNEE SURGERY          Family History   Problem Relation Age of Onset    Other Father         malignant neoplasm of prostate    Prostate cancer Father     Hypertension Father     Mental illness Neg Hx         Social History     Socioeconomic History    Marital status: /Civil Union     Spouse name: Not on file    Number of children: Not on file    Years of education: Not on file    Highest education level: Not on file   Occupational History    Not on file   Social Needs    Financial resource strain: Not on file    Food insecurity:     Worry: Not on file     Inability: Not on file    Transportation needs:     Medical: Not on file     Non-medical: Not on file   Tobacco Use    Smoking status: Never Smoker    Smokeless tobacco: Never Used   Substance and Sexual Activity    Alcohol use: No    Drug use: Never    Sexual activity: Not on file   Lifestyle    Physical activity:     Days per week: Not on file     Minutes per session: Not on file    Stress: Not on file   Relationships    Social connections:     Talks on phone: Not on file     Gets together: Not on file     Attends Yarsani service: Not on file     Active member of club or organization: Not on file     Attends meetings of clubs or organizations: Not on file     Relationship status: Not on file    Intimate partner violence:     Fear of current or ex partner: Not on file     Emotionally abused: Not on file     Physically abused: Not on file     Forced sexual activity: Not on file   Other Topics Concern    Not on file   Social History Narrative    Not on file        Physical Exam:     /84   Pulse 74   Temp 99 °F (37 2 °C)   Resp 18   Ht 6' 2" (1 88 m)   Wt 127 kg (279 lb)   BMI 35 82 kg/m²     Physical Exam   Constitutional: He appears well-developed and well-nourished  No distress  HENT:   Head: Normocephalic and atraumatic  Right Ear: External ear normal    Left Ear: External ear normal    Nose: Nose normal    Mouth/Throat: Oropharynx is clear and moist  No oropharyngeal exudate  Eyes: Pupils are equal, round, and reactive to light  EOM are normal  Right eye exhibits no discharge  Left eye exhibits no discharge  No scleral icterus  Neck: No thyromegaly present  Cardiovascular: Normal rate and normal heart sounds  No murmur heard  Pulmonary/Chest: Effort normal and breath sounds normal  No respiratory distress  He has no wheezes  Abdominal: Soft  Bowel sounds are normal  He exhibits no distension and no mass  There is no tenderness  There is no rebound and no guarding  Musculoskeletal: Normal range of motion  Neurological: He is alert  He displays normal reflexes  Coordination normal    Skin: Skin is warm and dry  No rash noted  He is not diaphoretic  No erythema  Psychiatric: He has a normal mood and affect  His behavior is normal    Nursing note and vitals reviewed  Data:     Pre-operative work-up    Laboratory Results: I have personally reviewed the pertinent laboratory results/reports  and had a CBC/CMP from May 31st and they were acceptable  PT has on phone , not in my chart     EKG: I have personally reviewed pertinent films in PACS - EKG sinus rhythm - no signs of ischemia    No results found for this or any previous visit (from the past 672 hour(s))          Assessment & Recommendations:     Problem List Items Addressed This Visit        Cardiovascular and Mediastinum    Benign essential hypertension    Relevant Orders    POCT ECG (Completed)      Other Visit Diagnoses Preop examination    -  Primary    Relevant Orders    POCT ECG (Completed)    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              Pre-Op Evaluation Assessment  50 y o  male with planned surgery: Cataract B/L  Known risk factors for perioperative complications: None  Current medications which may produce withdrawal symptoms if withheld perioperatively: none  Pre-Op Evaluation Plan  1  Further preoperative workup as follows:   - None; no further preoperative work-up is required    2  Medication Management/Recommendations:   - Patient has been instructed to avoid herbs or non-directed vitamins the week prior to surgery to ensure no drug interactions with perioperative surgical and anesthetic medications  - Patient has been instructed to avoid aspirin containing medications or non-steroidal anti-inflammatory drugs for the week preceding surgery  3  Prophylaxis for cardiac events with perioperative beta-blockers: not indicated  4  Patient requires further consultation with: None    Clearance  Patient is CLEARED for surgery without any additional cardiac testing       Vivienne Quinteros [address] [address]  Phone#  [phone]  Fax#  [phone]

## 2019-07-18 DIAGNOSIS — I10 BENIGN ESSENTIAL HYPERTENSION: ICD-10-CM

## 2019-07-19 RX ORDER — ENALAPRIL MALEATE 10 MG/1
10 TABLET ORAL DAILY
Qty: 90 TABLET | Refills: 1 | Status: SHIPPED | OUTPATIENT
Start: 2019-07-19 | End: 2020-02-05 | Stop reason: SDUPTHER

## 2019-11-13 ENCOUNTER — OFFICE VISIT (OUTPATIENT)
Dept: FAMILY MEDICINE CLINIC | Facility: CLINIC | Age: 49
End: 2019-11-13
Payer: COMMERCIAL

## 2019-11-13 VITALS
WEIGHT: 290 LBS | TEMPERATURE: 100.3 F | RESPIRATION RATE: 16 BRPM | BODY MASS INDEX: 37.22 KG/M2 | HEART RATE: 88 BPM | HEIGHT: 74 IN | SYSTOLIC BLOOD PRESSURE: 122 MMHG | DIASTOLIC BLOOD PRESSURE: 80 MMHG

## 2019-11-13 DIAGNOSIS — J01.90 ACUTE SINUSITIS, RECURRENCE NOT SPECIFIED, UNSPECIFIED LOCATION: Primary | ICD-10-CM

## 2019-11-13 PROCEDURE — 1036F TOBACCO NON-USER: CPT | Performed by: NURSE PRACTITIONER

## 2019-11-13 PROCEDURE — 99213 OFFICE O/P EST LOW 20 MIN: CPT | Performed by: NURSE PRACTITIONER

## 2019-11-13 RX ORDER — CLARITHROMYCIN 500 MG/1
500 TABLET, COATED ORAL 2 TIMES DAILY
Qty: 20 TABLET | Refills: 0 | Status: SHIPPED | OUTPATIENT
Start: 2019-11-13 | End: 2019-11-23

## 2019-11-13 RX ORDER — LANSOPRAZOLE 15 MG/1
15 CAPSULE, DELAYED RELEASE ORAL DAILY
COMMUNITY

## 2019-11-13 NOTE — PATIENT INSTRUCTIONS
Take medication with food  It is important that you take the entire course of antibiotics prescribed  May also take a probiotic of your choice to maintain healthy GI tani  Can take some probiotic and yogurt with the medication  Increase fluid intake, saline nasal rinses, and hot tea with honey and lemon  Cool air humidification can be helpful as well  May take Ibuprofen or Tylenol as needed for pain or fevers  Mucinex D for sinus congestion or Coricidin HBP if you have high blood pressure or a heart condition  Mucinex or Robitussin DM are effective for cough and chest congestion  flonase 2 sprays in each nostril once daily  ,Supportive care discussed and advised  Advised to RTO for any worsening and no improvement  Follow up for no improvement and worsening of conditions  Patient advised and educated when to see immediate medical care

## 2019-11-13 NOTE — PROGRESS NOTES
Assessment/Plan:    1  Acute sinusitis, recurrence not specified, unspecified location  -     clarithromycin (BIAXIN) 500 mg tablet; Take 1 tablet (500 mg total) by mouth 2 (two) times a day for 10 days          BMI Counseling: Body mass index is 37 23 kg/m²  Discussed the patient's BMI with him  The BMI is above normal  Nutrition recommendations include reducing portion sizes, decreasing overall calorie intake, 3-5 servings of fruits/vegetables daily, reducing fast food intake, consuming healthier snacks and decreasing soda and/or juice intake  Patient Instructions: Take medication with food  It is important that you take the entire course of antibiotics prescribed  May also take a probiotic of your choice to maintain healthy GI tani  Can take some probiotic and yogurt with the medication  Increase fluid intake, saline nasal rinses, and hot tea with honey and lemon  Cool air humidification can be helpful as well  May take Ibuprofen or Tylenol as needed for pain or fevers  Mucinex D for sinus congestion or Coricidin HBP if you have high blood pressure or a heart condition  Mucinex or Robitussin DM are effective for cough and chest congestion  flonase 2 sprays in each nostril once daily  ,Supportive care discussed and advised  Advised to RTO for any worsening and no improvement  Follow up for no improvement and worsening of conditions  Patient advised and educated when to see immediate medical care  Return if symptoms worsen or fail to improve  No future appointments  Subjective:      Patient ID: Stasia Boxer is a 52 y o  male  Chief Complaint   Patient presents with    Cold Like Symptoms     congestion-wmcma    Cough         Vitals:  /80   Pulse 88   Temp 100 3 °F (37 9 °C)   Resp 16   Ht 6' 2" (1 88 m)   Wt 132 kg (290 lb)   BMI 37 23 kg/m²     HPI  Patient stated that started with cough and congestion from a week    Now progressed to left ear ache and sinus pressure  Denies fever, chills and sob  Not taking any OTC  The following portions of the patient's history were reviewed and updated as appropriate: allergies, current medications, past family history, past medical history, past social history, past surgical history and problem list       Review of Systems   Constitutional: Negative for chills, diaphoresis, fatigue, fever and unexpected weight change  HENT: Positive for congestion, ear pain and sinus pressure  Negative for dental problem, drooling, ear discharge, facial swelling, hearing loss, mouth sores, nosebleeds, postnasal drip, rhinorrhea, sinus pain, sneezing, sore throat, tinnitus, trouble swallowing and voice change  Respiratory: Positive for cough  Negative for chest tightness, shortness of breath and wheezing  Cardiovascular: Negative  Gastrointestinal: Negative for abdominal pain, constipation, diarrhea, nausea and vomiting  Musculoskeletal: Negative  Skin: Negative  Neurological: Negative for dizziness, weakness, light-headedness and headaches  Hematological: Negative  Objective:    Social History     Tobacco Use   Smoking Status Never Smoker   Smokeless Tobacco Never Used       Allergies:    Allergies   Allergen Reactions    Penicillins          Current Outpatient Medications   Medication Sig Dispense Refill    aspirin (ASPIRIN ADULT LOW DOSE) 81 mg EC tablet Take by mouth daily      enalapril (VASOTEC) 10 mg tablet Take 1 tablet (10 mg total) by mouth daily 90 tablet 1    EPINEPHrine (EPIPEN) 0 3 mg/0 3 mL SOAJ Inject 0 3 mL (0 3 mg total) into the shoulder, thigh, or buttocks once for 1 dose As needed for anaphylaxis, proceed to ER 1 each 0    folic acid (FOLVITE) 1 mg tablet   3    lansoprazole (PREVACID) 15 mg capsule Take 15 mg by mouth daily      methotrexate 2 5 mg tablet TAKE 10 TABLETS ONCE A WEEK  2    tobramycin-dexamethasone (TOBRADEX) ophthalmic suspension Apply to eye      clarithromycin (BIAXIN) 500 mg tablet Take 1 tablet (500 mg total) by mouth 2 (two) times a day for 10 days 20 tablet 0     No current facility-administered medications for this visit  Physical Exam   Constitutional: He is oriented to person, place, and time  He appears well-developed and well-nourished  HENT:   Head: Normocephalic  Right Ear: External ear and ear canal normal  Tympanic membrane is bulging  Left Ear: Tympanic membrane, external ear and ear canal normal    Nose: Mucosal edema present  Right sinus exhibits maxillary sinus tenderness  Right sinus exhibits no frontal sinus tenderness  Left sinus exhibits maxillary sinus tenderness  Left sinus exhibits no frontal sinus tenderness  Mouth/Throat: Oropharynx is clear and moist and mucous membranes are normal    Neck: Neck supple  Cardiovascular: Normal rate, regular rhythm and normal heart sounds  Pulmonary/Chest: Effort normal and breath sounds normal    Abdominal: Normal appearance and bowel sounds are normal  There is no hepatosplenomegaly  There is no tenderness  There is no rebound  Musculoskeletal: Normal range of motion  Lymphadenopathy:        Right cervical: No superficial cervical and no posterior cervical adenopathy present  Left cervical: No superficial cervical and no posterior cervical adenopathy present  Neurological: He is alert and oriented to person, place, and time  Skin: Skin is warm and dry  Psychiatric: He has a normal mood and affect  His behavior is normal  Judgment and thought content normal    Vitals reviewed                    EL Chowdhury

## 2020-02-05 DIAGNOSIS — I10 BENIGN ESSENTIAL HYPERTENSION: ICD-10-CM

## 2020-02-05 RX ORDER — ENALAPRIL MALEATE 10 MG/1
10 TABLET ORAL DAILY
Qty: 90 TABLET | Refills: 1 | Status: SHIPPED | OUTPATIENT
Start: 2020-02-05 | End: 2020-05-05 | Stop reason: SDUPTHER

## 2020-05-05 DIAGNOSIS — I10 BENIGN ESSENTIAL HYPERTENSION: ICD-10-CM

## 2020-05-05 RX ORDER — ENALAPRIL MALEATE 10 MG/1
10 TABLET ORAL DAILY
Qty: 90 TABLET | Refills: 0 | Status: SHIPPED | OUTPATIENT
Start: 2020-05-05 | End: 2020-07-25 | Stop reason: SDUPTHER

## 2020-07-25 DIAGNOSIS — I10 BENIGN ESSENTIAL HYPERTENSION: ICD-10-CM

## 2020-07-27 ENCOUNTER — OFFICE VISIT (OUTPATIENT)
Dept: FAMILY MEDICINE CLINIC | Facility: CLINIC | Age: 50
End: 2020-07-27
Payer: COMMERCIAL

## 2020-07-27 ENCOUNTER — APPOINTMENT (OUTPATIENT)
Dept: RADIOLOGY | Facility: CLINIC | Age: 50
End: 2020-07-27
Payer: COMMERCIAL

## 2020-07-27 VITALS
TEMPERATURE: 98.4 F | RESPIRATION RATE: 16 BRPM | HEART RATE: 85 BPM | SYSTOLIC BLOOD PRESSURE: 148 MMHG | HEIGHT: 74 IN | DIASTOLIC BLOOD PRESSURE: 96 MMHG | WEIGHT: 274 LBS | BODY MASS INDEX: 35.16 KG/M2 | OXYGEN SATURATION: 96 %

## 2020-07-27 DIAGNOSIS — Z12.5 SCREENING FOR PROSTATE CANCER: ICD-10-CM

## 2020-07-27 DIAGNOSIS — E66.01 SEVERE OBESITY (BMI 35.0-39.9) WITH COMORBIDITY (HCC): ICD-10-CM

## 2020-07-27 DIAGNOSIS — M53.3 COCCYODYNIA: ICD-10-CM

## 2020-07-27 DIAGNOSIS — M53.3 COCCYODYNIA: Primary | ICD-10-CM

## 2020-07-27 PROCEDURE — 3008F BODY MASS INDEX DOCD: CPT | Performed by: FAMILY MEDICINE

## 2020-07-27 PROCEDURE — 3080F DIAST BP >= 90 MM HG: CPT | Performed by: FAMILY MEDICINE

## 2020-07-27 PROCEDURE — 72220 X-RAY EXAM SACRUM TAILBONE: CPT

## 2020-07-27 PROCEDURE — 3077F SYST BP >= 140 MM HG: CPT | Performed by: FAMILY MEDICINE

## 2020-07-27 PROCEDURE — 1036F TOBACCO NON-USER: CPT | Performed by: FAMILY MEDICINE

## 2020-07-27 PROCEDURE — 99213 OFFICE O/P EST LOW 20 MIN: CPT | Performed by: FAMILY MEDICINE

## 2020-07-27 RX ORDER — ENALAPRIL MALEATE 10 MG/1
10 TABLET ORAL DAILY
Qty: 90 TABLET | Refills: 0 | Status: SHIPPED | OUTPATIENT
Start: 2020-07-27 | End: 2020-10-07

## 2020-07-27 RX ORDER — PREDNISOLONE ACETATE 10 MG/ML
SUSPENSION/ DROPS OPHTHALMIC
COMMUNITY
Start: 2020-06-25 | End: 2021-05-07

## 2020-07-27 RX ORDER — BRIMONIDINE TARTRATE/TIMOLOL 0.2%-0.5%
DROPS OPHTHALMIC (EYE)
COMMUNITY
Start: 2020-07-02

## 2020-07-27 NOTE — PATIENT INSTRUCTIONS
Obesity   AMBULATORY CARE:   Obesity  is when your body mass index (BMI) is greater than 30  Your healthcare provider will use your height and weight to measure your BMI  The risks of obesity include  many health problems, such as injuries or physical disability  You may need tests to check for the following:  · Diabetes     · High blood pressure or high cholesterol     · Heart disease     · Gallbladder or liver disease     · Cancer of the colon, breast, prostate, liver, or kidney     · Sleep apnea     · Arthritis or gout  Seek care immediately if:   · You have a severe headache, confusion, or difficulty speaking  · You have weakness on one side of your body  · You have chest pain, sweating, or shortness of breath  Contact your healthcare provider if:   · You have symptoms of gallbladder or liver disease, such as pain in your upper abdomen  · You have knee or hip pain and discomfort while walking  · You have symptoms of diabetes, such as intense hunger and thirst, and frequent urination  · You have symptoms of sleep apnea, such as snoring or daytime sleepiness  · You have questions or concerns about your condition or care  Treatment for obesity  focuses on helping you lose weight to improve your health  Even a small decrease in BMI can reduce the risk for many health problems  Your healthcare provider will help you set a weight-loss goal   · Lifestyle changes  are the first step in treating obesity  These include making healthy food choices and getting regular physical activity  Your healthcare provider may suggest a weight-loss program that involves coaching, education, and therapy  · Medicine  may help you lose weight when it is used with a healthy diet and physical activity  · Surgery  can help you lose weight if you are very obese and have other health problems  There are several types of weight-loss surgery  Ask your healthcare provider for more information    Be successful losing weight:   · Set small, realistic goals  An example of a small goal is to walk for 20 minutes 5 days a week  Anther goal is to lose 5% of your body weight  · Tell friends, family members, and coworkers about your goals  and ask for their support  Ask a friend to lose weight with you, or join a weight-loss support group  · Identify foods or triggers that may cause you to overeat , and find ways to avoid them  Remove tempting high-calorie foods from your home and workplace  Place a bowl of fresh fruit on your kitchen counter  If stress causes you to eat, then find other ways to cope with stress  · Keep a diary to track what you eat and drink  Also write down how many minutes of physical activity you do each day  Weigh yourself once a week and record it in your diary  Eating changes: You will need to eat 500 to 1,000 fewer calories each day than you currently eat to lose 1 to 2 pounds a week  The following changes will help you cut calories:  · Eat smaller portions  Use small plates, no larger than 9 inches in diameter  Fill your plate half full of fruits and vegetables  Measure your food using measuring cups until you know what a serving size looks like  · Eat 3 meals and 1 or 2 snacks each day  Plan your meals in advance  Rose Basurto and eat at home most of the time  Eat slowly  · Eat fruits and vegetables at every meal   They are low in calories and high in fiber, which makes you feel full  Do not add butter, margarine, or cream sauce to vegetables  Use herbs to season steamed vegetables  · Eat less fat and fewer fried foods  Eat more baked or grilled chicken and fish  These protein sources are lower in calories and fat than red meat  Limit fast food  Dress your salads with olive oil and vinegar instead of bottled dressing  · Limit the amount of sugar you eat  Do not drink sugary beverages  Limit alcohol  Activity changes:  Physical activity is good for your body in many ways   It helps you burn calories and build strong muscles  It decreases stress and depression, and improves your mood  It can also help you sleep better  Talk to your healthcare provider before you begin an exercise program   · Exercise for at least 30 minutes 5 days a week  Start slowly  Set aside time each day for physical activity that you enjoy and that is convenient for you  It is best to do both weight training and an activity that increases your heart rate, such as walking, bicycling, or swimming  · Find ways to be more active  Do yard work and housecleaning  Walk up the stairs instead of using elevators  Spend your leisure time going to events that require walking, such as outdoor festivals or fairs  This extra physical activity can help you lose weight and keep it off  Follow up with your healthcare provider as directed: You may need to meet with a dietitian  Write down your questions so you remember to ask them during your visits  © 2017 [employer] Information is for End User's use only and may not be sold, redistributed or otherwise used for commercial purposes  All illustrations and images included in CareNotes® are the copyrighted property of [employer]  or Deon Noriega  The above information is an  only  It is not intended as medical advice for individual conditions or treatments  Talk to your doctor, nurse or pharmacist before following any medical regimen to see if it is safe and effective for you

## 2020-07-27 NOTE — PROGRESS NOTES
Assessment/Plan:    1  Coccyodynia  -     XR sacrum and coccyx; Future; Expected date: 07/27/2020  -     PSA, Total Screen; Future  -     oxaprozin (DAYPRO) 600 MG tablet; Take 1 tablet (600 mg total) by mouth 2 (two) times a day as needed (Pain)    2  Screening for prostate cancer  -     PSA, Total Screen; Future    3  Severe obesity (BMI 35 0-39  9) with comorbidity ([facility] [address] )    4  BMI 35 0-35 9,adult            Patient Instructions     Obesity   AMBULATORY CARE:   Obesity  is when your body mass index (BMI) is greater than 30  Your healthcare provider will use your height and weight to measure your BMI  The risks of obesity include  many health problems, such as injuries or physical disability  You may need tests to check for the following:  · Diabetes     · High blood pressure or high cholesterol     · Heart disease     · Gallbladder or liver disease     · Cancer of the colon, breast, prostate, liver, or kidney     · Sleep apnea     · Arthritis or gout  Seek care immediately if:   · You have a severe headache, confusion, or difficulty speaking  · You have weakness on one side of your body  · You have chest pain, sweating, or shortness of breath  Contact your healthcare provider if:   · You have symptoms of gallbladder or liver disease, such as pain in your upper abdomen  · You have knee or hip pain and discomfort while walking  · You have symptoms of diabetes, such as intense hunger and thirst, and frequent urination  · You have symptoms of sleep apnea, such as snoring or daytime sleepiness  · You have questions or concerns about your condition or care  Treatment for obesity  focuses on helping you lose weight to improve your health  Even a small decrease in BMI can reduce the risk for many health problems  Your healthcare provider will help you set a weight-loss goal   · Lifestyle changes  are the first step in treating obesity   These include making healthy food choices and getting regular physical activity  Your healthcare provider may suggest a weight-loss program that involves coaching, education, and therapy  · Medicine  may help you lose weight when it is used with a healthy diet and physical activity  · Surgery  can help you lose weight if you are very obese and have other health problems  There are several types of weight-loss surgery  Ask your healthcare provider for more information  Be successful losing weight:   · Set small, realistic goals  An example of a small goal is to walk for 20 minutes 5 days a week  Anther goal is to lose 5% of your body weight  · Tell friends, family members, and coworkers about your goals  and ask for their support  Ask a friend to lose weight with you, or join a weight-loss support group  · Identify foods or triggers that may cause you to overeat , and find ways to avoid them  Remove tempting high-calorie foods from your home and workplace  Place a bowl of fresh fruit on your kitchen counter  If stress causes you to eat, then find other ways to cope with stress  · Keep a diary to track what you eat and drink  Also write down how many minutes of physical activity you do each day  Weigh yourself once a week and record it in your diary  Eating changes: You will need to eat 500 to 1,000 fewer calories each day than you currently eat to lose 1 to 2 pounds a week  The following changes will help you cut calories:  · Eat smaller portions  Use small plates, no larger than 9 inches in diameter  Fill your plate half full of fruits and vegetables  Measure your food using measuring cups until you know what a serving size looks like  · Eat 3 meals and 1 or 2 snacks each day  Plan your meals in advance  Jae Sainz and eat at home most of the time  Eat slowly  · Eat fruits and vegetables at every meal   They are low in calories and high in fiber, which makes you feel full  Do not add butter, margarine, or cream sauce to vegetables   Use herbs to season steamed vegetables  · Eat less fat and fewer fried foods  Eat more baked or grilled chicken and fish  These protein sources are lower in calories and fat than red meat  Limit fast food  Dress your salads with olive oil and vinegar instead of bottled dressing  · Limit the amount of sugar you eat  Do not drink sugary beverages  Limit alcohol  Activity changes:  Physical activity is good for your body in many ways  It helps you burn calories and build strong muscles  It decreases stress and depression, and improves your mood  It can also help you sleep better  Talk to your healthcare provider before you begin an exercise program   · Exercise for at least 30 minutes 5 days a week  Start slowly  Set aside time each day for physical activity that you enjoy and that is convenient for you  It is best to do both weight training and an activity that increases your heart rate, such as walking, bicycling, or swimming  · Find ways to be more active  Do yard work and housecleaning  Walk up the stairs instead of using elevators  Spend your leisure time going to events that require walking, such as outdoor festivals or fairs  This extra physical activity can help you lose weight and keep it off  Follow up with your healthcare provider as directed: You may need to meet with a dietitian  Write down your questions so you remember to ask them during your visits  © 2017 [employer] Information is for End User's use only and may not be sold, redistributed or otherwise used for commercial purposes  All illustrations and images included in CareNotes® are the copyrighted property of [employer]  or Deon Noriega  The above information is an  only  It is not intended as medical advice for individual conditions or treatments  Talk to your doctor, nurse or pharmacist before following any medical regimen to see if it is safe and effective for you          No follow-ups on file     Subjective:      Patient ID: Calli Peace is a 52 y o  male  Chief Complaint   Patient presents with    tail bone pain     wmcma       Pt states threee weeks ago he started having tailbone pain  No trauma he can think of  He has been working on his house  Has not gone away  Pt states when he goes to the bathroom sometimes he feels like his but was opening up a little bit  The following portions of the patient's history were reviewed and updated as appropriate: allergies, current medications, past family history, past medical history, past social history, past surgical history and problem list     Review of Systems   Constitutional: Negative for activity change, appetite change, chills, diaphoresis, fatigue, fever and unexpected weight change  HENT: Negative for congestion, dental problem, ear pain, mouth sores, sinus pressure, sinus pain, sore throat and trouble swallowing  Eyes: Negative for photophobia, discharge and itching  Respiratory: Negative for apnea, chest tightness and shortness of breath  Cardiovascular: Negative for chest pain, palpitations and leg swelling  Gastrointestinal: Negative for abdominal distention, abdominal pain, blood in stool, nausea and vomiting  Endocrine: Negative for cold intolerance, heat intolerance, polydipsia, polyphagia and polyuria  Genitourinary: Negative for difficulty urinating  Musculoskeletal: Positive for arthralgias and back pain  Skin: Negative for color change and wound  Neurological: Negative for dizziness, syncope, speech difficulty and headaches  Hematological: Negative for adenopathy  Psychiatric/Behavioral: Negative for agitation and behavioral problems           Current Outpatient Medications   Medication Sig Dispense Refill    aspirin (ASPIRIN ADULT LOW DOSE) 81 mg EC tablet Take by mouth daily      COMBIGAN 0 2-0 5 % INSTILL 1 DROP TWO TIMES A DAY IN [facility]      enalapril (VASOTEC) 10 mg tablet Take 1 tablet (10 mg total) by mouth daily 90 tablet 0    EPINEPHrine (EPIPEN) 0 3 mg/0 3 mL SOAJ Inject 0 3 mL (0 3 mg total) into the shoulder, thigh, or buttocks once for 1 dose As needed for anaphylaxis, proceed to ER 1 each 0    folic acid (FOLVITE) 1 mg tablet   3    lansoprazole (PREVACID) 15 mg capsule Take 15 mg by mouth daily      methotrexate 2 5 mg tablet TAKE 10 TABLETS ONCE A WEEK  2    prednisoLONE acetate (PRED FORTE) 1 % ophthalmic suspension INSTILL ONE DROP INTO EACH EYE FOUR TIMES A DAY      oxaprozin (DAYPRO) 600 MG tablet Take 1 tablet (600 mg total) by mouth 2 (two) times a day as needed (Pain) 60 tablet 0    tobramycin-dexamethasone (TOBRADEX) ophthalmic suspension Apply to eye       No current facility-administered medications for this visit  Objective:    /96   Pulse 85   Temp 98 4 °F (36 9 °C)   Resp 16   Ht 6' 2" (1 88 m)   Wt 124 kg (274 lb)   SpO2 96%   BMI 35 18 kg/m²        Physical Exam   Constitutional: He appears well-developed and well-nourished  No distress  HENT:   Head: Normocephalic and atraumatic  Right Ear: External ear normal    Left Ear: External ear normal    Nose: Nose normal    Mouth/Throat: Oropharynx is clear and moist  No oropharyngeal exudate  Eyes: Pupils are equal, round, and reactive to light  EOM are normal  Right eye exhibits no discharge  Left eye exhibits no discharge  No scleral icterus  Neck: No thyromegaly present  Cardiovascular: Normal rate and normal heart sounds  No murmur heard  Pulmonary/Chest: Effort normal and breath sounds normal  No respiratory distress  He has no wheezes  Abdominal: Soft  Bowel sounds are normal  He exhibits no distension and no mass  There is no tenderness  There is no rebound and no guarding  Genitourinary: Prostate normal    Musculoskeletal: Normal range of motion  Tender at his coccyx   Neurological: He is alert  He displays normal reflexes   Coordination normal    Skin: Skin is warm and dry  No rash noted  He is not diaphoretic  No erythema  Psychiatric: He has a normal mood and affect  His behavior is normal    Nursing note and vitals reviewed  Catalina Mulligan DO  BMI Counseling: Body mass index is 35 18 kg/m²  The BMI is above normal  Nutrition recommendations include reducing portion sizes

## 2020-08-03 LAB — PSA SERPL-MCNC: 1.2 NG/ML (ref 0–4)

## 2020-08-27 ENCOUNTER — OFFICE VISIT (OUTPATIENT)
Dept: FAMILY MEDICINE CLINIC | Facility: CLINIC | Age: 50
End: 2020-08-27
Payer: COMMERCIAL

## 2020-08-27 VITALS
BODY MASS INDEX: 35.81 KG/M2 | TEMPERATURE: 97.2 F | HEART RATE: 78 BPM | RESPIRATION RATE: 16 BRPM | HEIGHT: 74 IN | WEIGHT: 279 LBS | SYSTOLIC BLOOD PRESSURE: 130 MMHG | DIASTOLIC BLOOD PRESSURE: 84 MMHG

## 2020-08-27 DIAGNOSIS — E78.1 HIGH TRIGLYCERIDES: ICD-10-CM

## 2020-08-27 DIAGNOSIS — Z23 NEED FOR VACCINATION: ICD-10-CM

## 2020-08-27 DIAGNOSIS — Z11.4 SCREENING FOR HIV (HUMAN IMMUNODEFICIENCY VIRUS): ICD-10-CM

## 2020-08-27 DIAGNOSIS — L12.1 OCP (OCULAR CICATRICIAL PEMPHIGOID): ICD-10-CM

## 2020-08-27 DIAGNOSIS — Z00.00 WELL ADULT EXAM: Primary | ICD-10-CM

## 2020-08-27 DIAGNOSIS — R74.8 ELEVATED LIVER ENZYMES: ICD-10-CM

## 2020-08-27 DIAGNOSIS — I10 BENIGN ESSENTIAL HYPERTENSION: ICD-10-CM

## 2020-08-27 PROCEDURE — 3075F SYST BP GE 130 - 139MM HG: CPT | Performed by: FAMILY MEDICINE

## 2020-08-27 PROCEDURE — 3008F BODY MASS INDEX DOCD: CPT | Performed by: FAMILY MEDICINE

## 2020-08-27 PROCEDURE — 3725F SCREEN DEPRESSION PERFORMED: CPT | Performed by: FAMILY MEDICINE

## 2020-08-27 PROCEDURE — 90682 RIV4 VACC RECOMBINANT DNA IM: CPT

## 2020-08-27 PROCEDURE — 1036F TOBACCO NON-USER: CPT | Performed by: FAMILY MEDICINE

## 2020-08-27 PROCEDURE — 90472 IMMUNIZATION ADMIN EACH ADD: CPT

## 2020-08-27 PROCEDURE — 90715 TDAP VACCINE 7 YRS/> IM: CPT

## 2020-08-27 PROCEDURE — 3079F DIAST BP 80-89 MM HG: CPT | Performed by: FAMILY MEDICINE

## 2020-08-27 PROCEDURE — 90471 IMMUNIZATION ADMIN: CPT

## 2020-08-27 PROCEDURE — 99396 PREV VISIT EST AGE 40-64: CPT | Performed by: FAMILY MEDICINE

## 2020-08-27 NOTE — PROGRESS NOTES
FAMILY PRACTICE HEALTH MAINTENANCE OFFICE VISIT  [facility]    NAME: Leonardo Eli  AGE: 52 y o  SEX: male  : 1970     DATE: 2020    Assessment and Plan     1  Well adult exam    2  Benign essential hypertension  -     Comprehensive metabolic panel; Future  -     Lipid Panel with Direct LDL reflex; Future  -     Comprehensive metabolic panel  -     Lipid Panel with Direct LDL reflex    3  Elevated liver enzymes  -     Comprehensive metabolic panel; Future  -     Lipid Panel with Direct LDL reflex; Future  -     Comprehensive metabolic panel  -     Lipid Panel with Direct LDL reflex    4  High triglycerides  -     Comprehensive metabolic panel; Future  -     Lipid Panel with Direct LDL reflex; Future  -     Comprehensive metabolic panel  -     Lipid Panel with Direct LDL reflex    5  OCP (ocular cicatricial pemphigoid)    6  Need for vaccination  -     influenza vaccine, quadrivalent, recombinant, PF, 0 5 mL, for patients 18 yr+ (FLUBLOK)  -     TDAP VACCINE GREATER THAN OR EQUAL TO 6YO IM    7  Screening for HIV (human immunodeficiency virus)  -     [facility]- Human Immunodeficiency Virus 1/2 Antigen / Antibody ( Fourth Generation) with Reflex Testing; Future        · Patient Counseling:   · Nutrition: Stressed importance of a well balanced diet, moderation of sodium/saturated fat, caloric balance and sufficient intake of fiber  · Exercise: Stressed the importance of regular exercise with a goal of 150 minutes per week  · Dental Health: Discussed daily flossing and brushing and regular dental visits     · Immunizations reviewed: see orders  · Discussed benefits of:  Screening labs   BMI Counseling: Body mass index is 35 82 kg/m²  Discussed with patient's BMI with him  The BMI is above normal  Nutrition recommendations include reducing portion sizes  Return in about 6 months (around 2021) for Recheck          Chief Complaint Chief Complaint   Patient presents with    Physical Exam     [facility]       History of Present Illness     Pt is here for a full a full physical  Had a PSa test  Coccyx pain is improving      Well Adult Physical   Patient here for a comprehensive physical exam       Diet and Physical Activity  Diet: well balanced diet  Exercise: intermittently      Depression Screen  PHQ-9 Depression Screening    PHQ-9:    Frequency of the following problems over the past two weeks:       Little interest or pleasure in doing things:  0 - not at all  Feeling down, depressed, or hopeless:  0 - not at all  PHQ-2 Score:  0          General Health  Hearing: Normal:  left  Vision: chronic issues  Dental: regular dental visits    Reproductive Health  No issues       The following portions of the patient's history were reviewed and updated as appropriate: allergies, current medications, past family history, past medical history, past social history, past surgical history and problem list     Review of Systems     Review of Systems   Constitutional: Negative for activity change, appetite change, chills, diaphoresis, fatigue, fever and unexpected weight change  HENT: Negative for congestion, dental problem, ear pain, mouth sores, sinus pressure, sinus pain, sore throat and trouble swallowing  Eyes: Negative for photophobia, discharge and itching  Respiratory: Negative for apnea, chest tightness and shortness of breath  Cardiovascular: Negative for chest pain, palpitations and leg swelling  Gastrointestinal: Negative for abdominal distention, abdominal pain, blood in stool, nausea and vomiting  Endocrine: Negative for cold intolerance, heat intolerance, polydipsia, polyphagia and polyuria  Genitourinary: Negative for difficulty urinating  Musculoskeletal: Negative for arthralgias  Skin: Negative for color change and wound  Neurological: Negative for dizziness, syncope, speech difficulty and headaches  Hematological: Negative for adenopathy  Psychiatric/Behavioral: Negative for agitation and behavioral problems         Past Medical History     Past Medical History:   Diagnosis Date    Allergic reaction     last assessed: 02/21/13    Environmental allergies     GERD (gastroesophageal reflux disease)     High blood pressure     Lymphadenopathy     last assessed: 12/29/14    Obesity, Class II, BMI 35-39 9     last assessed: 10/24/16    OCP (ocular cicatricial pemphigoid)     Suppurative lymphadenitis 11/20/2008       Past Surgical History     Past Surgical History:   Procedure Laterality Date    KNEE SURGERY         Social History     Social History     Socioeconomic History    Marital status: /Civil Union     Spouse name: None    Number of children: None    Years of education: None    Highest education level: None   Occupational History    None   Social Needs    Financial resource strain: None    Food insecurity     Worry: None     Inability: None    Transportation needs     Medical: None     Non-medical: None   Tobacco Use    Smoking status: Never Smoker    Smokeless tobacco: Never Used   Substance and Sexual Activity    Alcohol use: No    Drug use: Never    Sexual activity: None   Lifestyle    Physical activity     Days per week: None     Minutes per session: None    Stress: None   Relationships    Social connections     Talks on phone: None     Gets together: None     Attends Bahai service: None     Active member of club or organization: None     Attends meetings of clubs or organizations: None     Relationship status: None    Intimate partner violence     Fear of current or ex partner: None     Emotionally abused: None     Physically abused: None     Forced sexual activity: None   Other Topics Concern    None   Social History Narrative    None       Family History     Family History   Problem Relation Age of Onset    Other Father         malignant neoplasm of prostate  Prostate cancer Father     Hypertension Father     Mental illness Neg Hx        Current Medications       Current Outpatient Medications:     aspirin (ASPIRIN ADULT LOW DOSE) 81 mg EC tablet, Take by mouth daily, Disp: , Rfl:     COMBIGAN 0 2-0 5 %, INSTILL 1 DROP TWO TIMES A DAY IN EACH EYE, Disp: , Rfl:     enalapril (VASOTEC) 10 mg tablet, Take 1 tablet (10 mg total) by mouth daily, Disp: 90 tablet, Rfl: 0    EPINEPHrine (EPIPEN) 0 3 mg/0 3 mL SOAJ, Inject 0 3 mL (0 3 mg total) into the shoulder, thigh, or buttocks once for 1 dose As needed for anaphylaxis, proceed to ER, Disp: 1 each, Rfl: 0    folic acid (FOLVITE) 1 mg tablet, , Disp: , Rfl: 3    lansoprazole (PREVACID) 15 mg capsule, Take 15 mg by mouth daily, Disp: , Rfl:     methotrexate 2 5 mg tablet, TAKE 10 TABLETS ONCE A WEEK, Disp: , Rfl: 2    oxaprozin (DAYPRO) 600 MG tablet, Take 1 tablet (600 mg total) by mouth 2 (two) times a day as needed (Pain), Disp: 60 tablet, Rfl: 0    prednisoLONE acetate (PRED FORTE) 1 % ophthalmic suspension, INSTILL ONE DROP INTO EACH EYE FOUR TIMES A DAY, Disp: , Rfl:     riTUXimab (RITUXAN) rapid chemo infusion, Infuse into a venous catheter once, Disp: , Rfl:     tobramycin-dexamethasone (TOBRADEX) ophthalmic suspension, Apply to eye, Disp: , Rfl:      Allergies     Allergies   Allergen Reactions    Penicillins        Objective     /84   Pulse 78   Temp (!) 97 2 °F (36 2 °C)   Resp 16   Ht 6' 2" (1 88 m)   Wt 127 kg (279 lb)   BMI 35 82 kg/m²      Physical Exam  Vitals signs and nursing note reviewed  Constitutional:       General: He is not in acute distress  Appearance: He is well-developed  He is not diaphoretic  HENT:      Head: Normocephalic and atraumatic  Right Ear: External ear normal       Left Ear: External ear normal       Nose: Nose normal       Mouth/Throat:      Pharynx: No oropharyngeal exudate  Eyes:      General: No scleral icterus          Right eye: No discharge  Left eye: No discharge  Pupils: Pupils are equal, round, and reactive to light  Neck:      Thyroid: No thyromegaly  Cardiovascular:      Rate and Rhythm: Normal rate  Heart sounds: Normal heart sounds  No murmur  Pulmonary:      Effort: Pulmonary effort is normal  No respiratory distress  Breath sounds: Normal breath sounds  No wheezing  Abdominal:      General: Bowel sounds are normal  There is no distension  Palpations: Abdomen is soft  There is no mass  Tenderness: There is no abdominal tenderness  There is no guarding or rebound  Musculoskeletal: Normal range of motion  Skin:     General: Skin is warm and dry  Findings: No erythema or rash  Neurological:      Mental Status: He is alert        Coordination: Coordination normal       Deep Tendon Reflexes: Reflexes normal    Psychiatric:         Behavior: Behavior normal             Visual Acuity Screening    Right eye Left eye Both eyes   Without correction:      With correction: 20/25 20/25 20/25     Recent Results (from the past 672 hour(s))   PSA Total, Diagnostic    Collection Time: 08/01/20  8:36 AM   Result Value Ref Range    Prostate Specific Antigen Total 1 2 0 0 - 4 0 ng/mL           DO GLENNA [facility]

## 2020-10-07 ENCOUNTER — OFFICE VISIT (OUTPATIENT)
Dept: FAMILY MEDICINE CLINIC | Facility: CLINIC | Age: 50
End: 2020-10-07
Payer: COMMERCIAL

## 2020-10-07 VITALS
TEMPERATURE: 99.4 F | HEART RATE: 81 BPM | OXYGEN SATURATION: 98 % | SYSTOLIC BLOOD PRESSURE: 150 MMHG | DIASTOLIC BLOOD PRESSURE: 100 MMHG | BODY MASS INDEX: 35.94 KG/M2 | HEIGHT: 74 IN | WEIGHT: 280 LBS | RESPIRATION RATE: 18 BRPM

## 2020-10-07 DIAGNOSIS — I10 BENIGN ESSENTIAL HYPERTENSION: Primary | ICD-10-CM

## 2020-10-07 PROCEDURE — 3080F DIAST BP >= 90 MM HG: CPT | Performed by: FAMILY MEDICINE

## 2020-10-07 PROCEDURE — 3077F SYST BP >= 140 MM HG: CPT | Performed by: FAMILY MEDICINE

## 2020-10-07 PROCEDURE — 1036F TOBACCO NON-USER: CPT | Performed by: FAMILY MEDICINE

## 2020-10-07 PROCEDURE — 3725F SCREEN DEPRESSION PERFORMED: CPT | Performed by: FAMILY MEDICINE

## 2020-10-07 PROCEDURE — 99213 OFFICE O/P EST LOW 20 MIN: CPT | Performed by: FAMILY MEDICINE

## 2020-10-07 RX ORDER — LISINOPRIL 20 MG/1
20 TABLET ORAL DAILY
Qty: 90 TABLET | Refills: 0 | Status: SHIPPED | OUTPATIENT
Start: 2020-10-07 | End: 2021-01-05

## 2020-10-07 RX ORDER — ENALAPRIL MALEATE 10 MG/1
10 TABLET ORAL DAILY
Qty: 90 TABLET | Refills: 0 | Status: SHIPPED | OUTPATIENT
Start: 2020-10-07 | End: 2021-01-06 | Stop reason: SDUPTHER

## 2020-11-14 ENCOUNTER — TELEMEDICINE (OUTPATIENT)
Dept: FAMILY MEDICINE CLINIC | Facility: CLINIC | Age: 50
End: 2020-11-14
Payer: COMMERCIAL

## 2020-11-14 DIAGNOSIS — Z20.822 EXPOSURE TO COVID-19 VIRUS: Primary | ICD-10-CM

## 2020-11-14 PROCEDURE — 1036F TOBACCO NON-USER: CPT | Performed by: FAMILY MEDICINE

## 2020-11-14 PROCEDURE — 99213 OFFICE O/P EST LOW 20 MIN: CPT | Performed by: FAMILY MEDICINE

## 2021-01-05 DIAGNOSIS — I10 BENIGN ESSENTIAL HYPERTENSION: ICD-10-CM

## 2021-01-05 RX ORDER — LISINOPRIL 20 MG/1
20 TABLET ORAL DAILY
Qty: 90 TABLET | Refills: 0 | Status: SHIPPED | OUTPATIENT
Start: 2021-01-05 | End: 2021-06-30

## 2021-01-05 RX ORDER — LISINOPRIL 20 MG/1
TABLET ORAL
Qty: 90 TABLET | Refills: 0 | Status: SHIPPED | OUTPATIENT
Start: 2021-01-05 | End: 2021-03-06

## 2021-01-06 DIAGNOSIS — I10 BENIGN ESSENTIAL HYPERTENSION: ICD-10-CM

## 2021-01-06 RX ORDER — ENALAPRIL MALEATE 10 MG/1
10 TABLET ORAL DAILY
Qty: 90 TABLET | Refills: 0 | Status: SHIPPED | OUTPATIENT
Start: 2021-01-06 | End: 2021-04-01

## 2021-03-03 ENCOUNTER — TELEPHONE (OUTPATIENT)
Dept: FAMILY MEDICINE CLINIC | Facility: CLINIC | Age: 51
End: 2021-03-03

## 2021-03-06 ENCOUNTER — TELEMEDICINE (OUTPATIENT)
Dept: FAMILY MEDICINE CLINIC | Facility: CLINIC | Age: 51
End: 2021-03-06
Payer: COMMERCIAL

## 2021-03-06 DIAGNOSIS — J01.00 ACUTE NON-RECURRENT MAXILLARY SINUSITIS: Primary | ICD-10-CM

## 2021-03-06 PROCEDURE — 99213 OFFICE O/P EST LOW 20 MIN: CPT | Performed by: NURSE PRACTITIONER

## 2021-03-06 RX ORDER — AZITHROMYCIN 250 MG/1
TABLET, FILM COATED ORAL
Qty: 6 TABLET | Refills: 0 | Status: SHIPPED | OUTPATIENT
Start: 2021-03-06 | End: 2021-03-11

## 2021-03-06 NOTE — PROGRESS NOTES
Virtual Regular Visit      Assessment/Plan:    Continue Flonase and recommended saline sinus rinses  F/u as needed    Problem List Items Addressed This Visit     None      Visit Diagnoses     Acute non-recurrent maxillary sinusitis    -  Primary    Relevant Medications    azithromycin (ZITHROMAX) 250 mg tablet               Reason for visit is   Chief Complaint   Patient presents with    Virtual Regular Visit        Encounter provider EL Leong    Provider located at  [place] [id number]  [address] [place] [id number]      Recent Visits  Date Type Provider Dept   03/03/21 Telephone Latrell Fowler, [address] recent visits within past 7 days and meeting all other requirements     Today's Visits  Date Type Provider Dept   03/06/21 Telemedicine Arpan Leong Jordan today's visits and meeting all other requirements     Future Appointments  No visits were found meeting these conditions  Showing future appointments within next 150 days and meeting all other requirements        The patient was identified by name and date of birth  Stasia Boxer was informed that this is a telemedicine visit and that the visit is being conducted through [employer] and patient was informed that this is not a secure, HIPAA-compliant platform  He agrees to proceed     My office door was closed  No one else was in the room  He acknowledged consent and understanding of privacy and security of the video platform  The patient has agreed to participate and understands they can discontinue the visit at any time  Patient is aware this is a billable service  Subjective  Stasia Boxer is a 48 y o  male   PT has been experiencing sinus pressure and ear discomfort for the past several days  Reports minimal sinus congestion  Denies runny nose, sore throat, cough, SOB, or fevers/chills    He is using Flonase routinely for his allergies  No sick contacts or known exposure to COVID 19  Symptoms feel similar to prior sinus infetions  Past Medical History:   Diagnosis Date    Allergic reaction     last assessed: 02/21/13    Environmental allergies     GERD (gastroesophageal reflux disease)     High blood pressure     Lymphadenopathy     last assessed: 12/29/14    Obesity, Class II, BMI 35-39 9     last assessed: 10/24/16    OCP (ocular cicatricial pemphigoid)     Suppurative lymphadenitis 11/20/2008       Past Surgical History:   Procedure Laterality Date    KNEE SURGERY         Current Outpatient Medications   Medication Sig Dispense Refill    aspirin (ASPIRIN ADULT LOW DOSE) 81 mg EC tablet Take by mouth daily      azithromycin (ZITHROMAX) 250 mg tablet 2 tabs PO day 1, then 1 tab PO days 2-5 6 tablet 0    COMBIGAN 0 2-0 5 % INSTILL 1 DROP TWO TIMES A DAY IN EACH EYE      enalapril (VASOTEC) 10 mg tablet Take 1 tablet (10 mg total) by mouth daily 90 tablet 0    EPINEPHrine (EPIPEN) 0 3 mg/0 3 mL SOAJ Inject 0 3 mL (0 3 mg total) into the shoulder, thigh, or buttocks once for 1 dose As needed for anaphylaxis, proceed to ER 1 each 0    folic acid (FOLVITE) 1 mg tablet   3    lansoprazole (PREVACID) 15 mg capsule Take 15 mg by mouth daily      lisinopril (ZESTRIL) 20 mg tablet Take 1 tablet (20 mg total) by mouth daily 90 tablet 0    methotrexate 2 5 mg tablet TAKE 10 TABLETS ONCE A WEEK  2    oxaprozin (DAYPRO) 600 MG tablet Take 1 tablet (600 mg total) by mouth 2 (two) times a day as needed (Pain) (Patient not taking: Reported on 10/7/2020) 60 tablet 0    prednisoLONE acetate (PRED FORTE) 1 % ophthalmic suspension INSTILL ONE DROP INTO EACH EYE FOUR TIMES A DAY      riTUXimab (RITUXAN) rapid chemo infusion Infuse into a venous catheter once      tobramycin-dexamethasone (TOBRADEX) ophthalmic suspension Apply to eye       No current facility-administered medications for this visit           Allergies Allergen Reactions    Penicillins        Review of Systems   Constitutional: Negative for chills, fatigue and fever  HENT: Positive for ear pain and sinus pressure  Negative for congestion, postnasal drip, rhinorrhea and sore throat  Respiratory: Negative for cough, shortness of breath and wheezing  Cardiovascular: Negative for chest pain  Gastrointestinal: Negative for abdominal pain, diarrhea, nausea and vomiting  Musculoskeletal: Negative for arthralgias  Skin: Negative for rash  Neurological: Positive for headaches  Video Exam    There were no vitals filed for this visit  Physical Exam  Constitutional:       General: He is not in acute distress  Appearance: He is well-developed  He is not ill-appearing or diaphoretic  Eyes:      Conjunctiva/sclera: Conjunctivae normal    Pulmonary:      Effort: Pulmonary effort is normal  No respiratory distress  Skin:     Coloration: Skin is not pale  Neurological:      Mental Status: He is alert  Psychiatric:         Mood and Affect: Mood normal          Speech: Speech normal          Behavior: Behavior normal           I spent 8 minutes directly with the patient during this visit      VIRTUAL VISIT DISCLAIMER    Frank Escamilla acknowledges that he has consented to an online visit or consultation  He understands that the online visit is based solely on information provided by him, and that, in the absence of a face-to-face physical evaluation by the physician, the diagnosis he receives is both limited and provisional in terms of accuracy and completeness  This is not intended to replace a full medical face-to-face evaluation by the physician  Frank Escamilla understands and accepts these terms

## 2021-03-26 ENCOUNTER — OFFICE VISIT (OUTPATIENT)
Dept: FAMILY MEDICINE CLINIC | Facility: CLINIC | Age: 51
End: 2021-03-26
Payer: COMMERCIAL

## 2021-03-26 VITALS
BODY MASS INDEX: 36.83 KG/M2 | SYSTOLIC BLOOD PRESSURE: 128 MMHG | HEART RATE: 90 BPM | RESPIRATION RATE: 16 BRPM | HEIGHT: 74 IN | OXYGEN SATURATION: 97 % | DIASTOLIC BLOOD PRESSURE: 88 MMHG | TEMPERATURE: 99 F | WEIGHT: 287 LBS

## 2021-03-26 DIAGNOSIS — H92.02 ACUTE OTALGIA, LEFT: Primary | ICD-10-CM

## 2021-03-26 PROCEDURE — 99213 OFFICE O/P EST LOW 20 MIN: CPT | Performed by: NURSE PRACTITIONER

## 2021-03-26 PROCEDURE — 3079F DIAST BP 80-89 MM HG: CPT | Performed by: NURSE PRACTITIONER

## 2021-03-26 PROCEDURE — 1036F TOBACCO NON-USER: CPT | Performed by: NURSE PRACTITIONER

## 2021-03-26 PROCEDURE — 3008F BODY MASS INDEX DOCD: CPT | Performed by: NURSE PRACTITIONER

## 2021-03-26 PROCEDURE — 3074F SYST BP LT 130 MM HG: CPT | Performed by: NURSE PRACTITIONER

## 2021-03-26 PROCEDURE — 3725F SCREEN DEPRESSION PERFORMED: CPT | Performed by: NURSE PRACTITIONER

## 2021-03-26 RX ORDER — MYCOPHENOLATE MOFETIL 500 MG/1
500 TABLET ORAL 2 TIMES DAILY
COMMUNITY
Start: 2021-03-24 | End: 2021-05-07

## 2021-03-26 RX ORDER — PREDNISONE 10 MG/1
20 TABLET ORAL DAILY
COMMUNITY
Start: 2021-03-08

## 2021-03-26 RX ORDER — CLARITHROMYCIN 500 MG/1
500 TABLET, COATED ORAL EVERY 12 HOURS SCHEDULED
Qty: 20 TABLET | Refills: 0 | Status: SHIPPED | OUTPATIENT
Start: 2021-03-26 | End: 2021-04-05

## 2021-03-26 RX ORDER — MYCOPHENOLATE MOFETIL 500 MG/1
TABLET ORAL EVERY 12 HOURS SCHEDULED
COMMUNITY
End: 2021-05-07

## 2021-03-26 NOTE — PATIENT INSTRUCTIONS
Take medication with food  It is important that you take the entire course of antibiotics prescribed  May also take a probiotic of your choice to maintain healthy GI tani  Can take some probiotic and yogurt with the medication  Supportive care discussed and advised  Advised to RTO for any worsening and no improvement  Follow up for no improvement and worsening of conditions  Patient advised and educated when to see immediate medical care

## 2021-03-26 NOTE — PROGRESS NOTES
Assessment/Plan:    1  Acute otalgia, left  -     clarithromycin (BIAXIN) 500 mg tablet; Take 1 tablet (500 mg total) by mouth every 12 (twelve) hours for 10 days  -     neomycin-polymyxin-hydrocortisone (CORTISPORIN) otic solution; Administer 3 drops into the left ear every 8 (eight) hours for 7 days          BMI Counseling: Body mass index is 36 85 kg/m²  Discussed the patient's BMI with him  The BMI is above normal  Nutrition recommendations include reducing portion sizes, decreasing overall calorie intake, 3-5 servings of fruits/vegetables daily, reducing fast food intake, consuming healthier snacks, decreasing soda and/or juice intake, moderation in carbohydrate intake, increasing intake of lean protein, reducing intake of saturated fat and trans fat and reducing intake of cholesterol  Patient Instructions: Take medication with food  It is important that you take the entire course of antibiotics prescribed  May also take a probiotic of your choice to maintain healthy GI tani  Can take some probiotic and yogurt with the medication  Supportive care discussed and advised  Advised to RTO for any worsening and no improvement  Follow up for no improvement and worsening of conditions  Patient advised and educated when to see immediate medical care  Return if symptoms worsen or fail to improve  No future appointments  Subjective:      Patient ID: Josh Chapman is a 48 y o  male  Chief Complaint   Patient presents with    Earache     Left 3 days duration  Here 3 weeks ago for sinus infection  Vitals:  /88   Pulse 90   Temp 99 °F (37 2 °C)   Resp 16   Ht 6' 2" (1 88 m)   Wt 130 kg (287 lb)   SpO2 97%   BMI 36 85 kg/m²     HPI  Patient stated that having left ear ache from couple of days  Stated that was recently treated with z-pack for sinus infection and was having ear ache at that time and did not help  Denies fever, chills, and hearing issue    Not taking any OTC for symptoms  PHQ-9 Depression Screening    PHQ-9:   Frequency of the following problems over the past two weeks:      Little interest or pleasure in doing things: 0 - not at all  Feeling down, depressed, or hopeless: 0 - not at all  PHQ-2 Score: 0             The following portions of the patient's history were reviewed and updated as appropriate: allergies, current medications, past family history, past medical history, past social history, past surgical history and problem list       Review of Systems   Constitutional: Negative for chills, diaphoresis, fatigue, fever and unexpected weight change  HENT: Positive for ear pain  Negative for congestion, dental problem, drooling, ear discharge, facial swelling, hearing loss, mouth sores, nosebleeds, postnasal drip, rhinorrhea, sinus pressure, sinus pain, sneezing, sore throat, tinnitus, trouble swallowing and voice change  Respiratory: Negative for cough, chest tightness, shortness of breath and wheezing  Cardiovascular: Negative  Gastrointestinal: Negative for abdominal pain, constipation, diarrhea, nausea and vomiting  Musculoskeletal: Negative  Skin: Negative  Neurological: Negative for dizziness, light-headedness and headaches  Hematological: Negative  Objective:    Social History     Tobacco Use   Smoking Status Never Smoker   Smokeless Tobacco Never Used       Allergies:    Allergies   Allergen Reactions    Penicillins          Current Outpatient Medications   Medication Sig Dispense Refill    aspirin (ASPIRIN ADULT LOW DOSE) 81 mg EC tablet Take by mouth daily      COMBIGAN 0 2-0 5 % INSTILL 1 DROP TWO TIMES A DAY IN EACH EYE      enalapril (VASOTEC) 10 mg tablet Take 1 tablet (10 mg total) by mouth daily 90 tablet 0    lansoprazole (PREVACID) 15 mg capsule Take 15 mg by mouth daily      lisinopril (ZESTRIL) 20 mg tablet Take 1 tablet (20 mg total) by mouth daily 90 tablet 0    mycophenolate (CELLCEPT) 500 mg tablet Take by mouth every 12 (twelve) hours 2 pills twice daily      riTUXimab (RITUXAN) rapid chemo infusion Infuse into a venous catheter once      clarithromycin (BIAXIN) 500 mg tablet Take 1 tablet (500 mg total) by mouth every 12 (twelve) hours for 10 days 20 tablet 0    EPINEPHrine (EPIPEN) 0 3 mg/0 3 mL SOAJ Inject 0 3 mL (0 3 mg total) into the shoulder, thigh, or buttocks once for 1 dose As needed for anaphylaxis, proceed to ER 1 each 0    folic acid (FOLVITE) 1 mg tablet   3    methotrexate 2 5 mg tablet TAKE 10 TABLETS ONCE A WEEK  2    mycophenolate (CELLCEPT) 500 mg tablet Take 1,000 mg by mouth 2 (two) times a day      neomycin-polymyxin-hydrocortisone (CORTISPORIN) otic solution Administer 3 drops into the left ear every 8 (eight) hours for 7 days 10 mL 0    oxaprozin (DAYPRO) 600 MG tablet Take 1 tablet (600 mg total) by mouth 2 (two) times a day as needed (Pain) (Patient not taking: Reported on 3/26/2021) 60 tablet 0    prednisoLONE acetate (PRED FORTE) 1 % ophthalmic suspension INSTILL ONE DROP INTO EACH EYE FOUR TIMES A DAY      predniSONE 10 mg tablet Take 20 mg by mouth daily      TobraDex ophthalmic ointment APPLY IN AFFECTED EYE ONCE DAILY AT BEDTIME A SMALL AMOUNT INTO THE CONJUCTIVAL SAC      tobramycin-dexamethasone (TOBRADEX) ophthalmic suspension Apply to eye       No current facility-administered medications for this visit  Physical Exam  Vitals signs reviewed  Constitutional:       Appearance: Normal appearance  He is well-developed  HENT:      Head: Normocephalic  Right Ear: External ear normal  Drainage and tenderness present  Left Ear: Tympanic membrane, ear canal and external ear normal       Ears:      Comments: Left ear with discharge and not able to visualize TM     Nose: Nose normal       Right Sinus: No maxillary sinus tenderness or frontal sinus tenderness  Left Sinus: No maxillary sinus tenderness or frontal sinus tenderness  Mouth/Throat:      Mouth: No oral lesions  Pharynx: No oropharyngeal exudate or posterior oropharyngeal erythema  Neck:      Musculoskeletal: Neck supple  Cardiovascular:      Rate and Rhythm: Normal rate and regular rhythm  Heart sounds: Normal heart sounds  Pulmonary:      Effort: Pulmonary effort is normal       Breath sounds: Normal breath sounds  Musculoskeletal: Normal range of motion  Lymphadenopathy:      Cervical:      Right cervical: No superficial or posterior cervical adenopathy  Left cervical: No superficial or posterior cervical adenopathy  Skin:     General: Skin is warm and dry  Neurological:      Mental Status: He is alert and oriented to person, place, and time  Psychiatric:         Behavior: Behavior normal          Thought Content:  Thought content normal          Judgment: Judgment normal                      OneEL Naidu

## 2021-03-30 DIAGNOSIS — Z23 ENCOUNTER FOR IMMUNIZATION: ICD-10-CM

## 2021-04-01 DIAGNOSIS — I10 BENIGN ESSENTIAL HYPERTENSION: ICD-10-CM

## 2021-04-01 RX ORDER — ENALAPRIL MALEATE 10 MG/1
TABLET ORAL
Qty: 90 TABLET | Refills: 0 | Status: SHIPPED | OUTPATIENT
Start: 2021-04-01 | End: 2021-06-30

## 2021-04-16 ENCOUNTER — OFFICE VISIT (OUTPATIENT)
Dept: FAMILY MEDICINE CLINIC | Facility: CLINIC | Age: 51
End: 2021-04-16
Payer: COMMERCIAL

## 2021-04-16 VITALS
HEIGHT: 74 IN | WEIGHT: 287 LBS | BODY MASS INDEX: 36.83 KG/M2 | HEART RATE: 85 BPM | TEMPERATURE: 99.5 F | SYSTOLIC BLOOD PRESSURE: 126 MMHG | OXYGEN SATURATION: 97 % | DIASTOLIC BLOOD PRESSURE: 78 MMHG | RESPIRATION RATE: 18 BRPM

## 2021-04-16 DIAGNOSIS — H92.02 OTALGIA OF LEFT EAR: Primary | ICD-10-CM

## 2021-04-16 PROCEDURE — 99213 OFFICE O/P EST LOW 20 MIN: CPT | Performed by: FAMILY MEDICINE

## 2021-04-16 RX ORDER — AZITHROMYCIN 250 MG/1
TABLET, FILM COATED ORAL
Qty: 6 TABLET | Refills: 0 | Status: SHIPPED | OUTPATIENT
Start: 2021-04-16 | End: 2021-04-21

## 2021-04-16 NOTE — PROGRESS NOTES
Assessment/Plan:    1  Otalgia of left ear  -     azithromycin (ZITHROMAX) 250 mg tablet; 2 tabs on day 1, 1 tab a day for 4 days after    I will give z pack as that cleared his infection last time  Pt advised on debrox to use and wi will see him back early next week and debride the ear if necessary        There are no Patient Instructions on file for this visit  Return for 30 min appt next week ear cleaning  Subjective:      Patient ID: Tylor Ram is a 48 y o  male  Chief Complaint   Patient presents with   Sylwia Sanders     left ear on going   sas/cma       Pt states he has an ear clog in his left ear that wont go away  Pt states he had a virtual appt given abx, was seen here given abx   Still has pain  Pt states the pain cleared and now its coming back again      The following portions of the patient's history were reviewed and updated as appropriate: allergies, current medications, past family history, past medical history, past social history, past surgical history and problem list     Review of Systems   Constitutional: Negative for activity change, appetite change, chills, diaphoresis, fatigue, fever and unexpected weight change  HENT: Positive for ear pain  Negative for congestion, dental problem, mouth sores, sinus pressure, sinus pain, sore throat and trouble swallowing  Eyes: Negative for photophobia, discharge and itching  Respiratory: Negative for apnea, chest tightness and shortness of breath  Cardiovascular: Negative for chest pain, palpitations and leg swelling  Gastrointestinal: Negative for abdominal distention, abdominal pain, blood in stool, nausea and vomiting  Endocrine: Negative for cold intolerance, heat intolerance, polydipsia, polyphagia and polyuria  Genitourinary: Negative for difficulty urinating  Musculoskeletal: Negative for arthralgias  Skin: Negative for color change and wound     Neurological: Negative for dizziness, syncope, speech difficulty and headaches  Hematological: Negative for adenopathy  Psychiatric/Behavioral: Negative for agitation and behavioral problems           Current Outpatient Medications   Medication Sig Dispense Refill    aspirin (ASPIRIN ADULT LOW DOSE) 81 mg EC tablet Take by mouth daily      COMBIGAN 0 2-0 5 % INSTILL 1 DROP TWO TIMES A DAY IN EACH EYE      enalapril (VASOTEC) 10 mg tablet TAKE ONE TABLET DAILY 90 tablet 0    EPINEPHrine (EPIPEN) 0 3 mg/0 3 mL SOAJ Inject 0 3 mL (0 3 mg total) into the shoulder, thigh, or buttocks once for 1 dose As needed for anaphylaxis, proceed to ER 1 each 0    lansoprazole (PREVACID) 15 mg capsule Take 15 mg by mouth daily      lisinopril (ZESTRIL) 20 mg tablet Take 1 tablet (20 mg total) by mouth daily 90 tablet 0    mycophenolate (CELLCEPT) 500 mg tablet Take 500 mg by mouth 2 (two) times a day 2 tablets 2 times per day      predniSONE 10 mg tablet Take 20 mg by mouth daily      riTUXimab (RITUXAN) rapid chemo infusion Infuse into a venous catheter once      azithromycin (ZITHROMAX) 250 mg tablet 2 tabs on day 1, 1 tab a day for 4 days after 6 tablet 0    folic acid (FOLVITE) 1 mg tablet   3    methotrexate 2 5 mg tablet TAKE 10 TABLETS ONCE A WEEK  2    mycophenolate (CELLCEPT) 500 mg tablet Take by mouth every 12 (twelve) hours 2 pills twice daily      neomycin-polymyxin-hydrocortisone (CORTISPORIN) otic solution Administer 3 drops into the left ear every 8 (eight) hours for 7 days (Patient not taking: Reported on 4/16/2021) 10 mL 0    oxaprozin (DAYPRO) 600 MG tablet Take 1 tablet (600 mg total) by mouth 2 (two) times a day as needed (Pain) (Patient not taking: Reported on 3/26/2021) 60 tablet 0    prednisoLONE acetate (PRED FORTE) 1 % ophthalmic suspension INSTILL ONE DROP INTO EACH EYE FOUR TIMES A DAY      TobraDex ophthalmic ointment APPLY IN AFFECTED EYE ONCE DAILY AT BEDTIME A SMALL AMOUNT INTO THE CONJUCTIVAL SAC      tobramycin-dexamethasone (TOBRADEX) ophthalmic suspension Apply to eye       No current facility-administered medications for this visit  Objective:    /78   Pulse 85   Temp 99 5 °F (37 5 °C)   Resp 18   Ht 6' 2" (1 88 m)   Wt 130 kg (287 lb)   SpO2 97%   BMI 36 85 kg/m²        Physical Exam  Vitals signs and nursing note reviewed  Constitutional:       General: He is not in acute distress  Appearance: He is well-developed  He is not diaphoretic  HENT:      Head: Normocephalic and atraumatic  Right Ear: External ear normal       Left Ear: External ear normal  There is impacted cerumen  Nose: Nose normal       Mouth/Throat:      Pharynx: No oropharyngeal exudate  Eyes:      General: No scleral icterus  Right eye: No discharge  Left eye: No discharge  Pupils: Pupils are equal, round, and reactive to light  Neck:      Thyroid: No thyromegaly  Cardiovascular:      Rate and Rhythm: Normal rate  Heart sounds: Normal heart sounds  No murmur  Pulmonary:      Effort: Pulmonary effort is normal  No respiratory distress  Breath sounds: Normal breath sounds  No wheezing  Abdominal:      General: Bowel sounds are normal  There is no distension  Palpations: Abdomen is soft  There is no mass  Tenderness: There is no abdominal tenderness  There is no guarding or rebound  Musculoskeletal: Normal range of motion  Skin:     General: Skin is warm and dry  Findings: No erythema or rash  Neurological:      Mental Status: He is alert        Coordination: Coordination normal       Deep Tendon Reflexes: Reflexes normal    Psychiatric:         Behavior: Behavior normal                 Charo Hyde DO

## 2021-04-19 ENCOUNTER — RA CDI HCC (OUTPATIENT)
Dept: OTHER | Facility: HOSPITAL | Age: 51
End: 2021-04-19

## 2021-04-19 NOTE — PROGRESS NOTES
[facility] 75  coding opportunities             Chart reviewed, (number of) suggestions sent to provider: 1     Problem listed updated  Provider Accepted, (number of) suggestions accepted: 1  E66 01      Patients insurance company: [employer] ([employer] and [employer])           Re: Fanny Loev    Based on clinical documentation indicated in the medical record, it appears that the patient may have the following condition:    E66 01 - Class 2 severe obesity due to excess calories with serious comorbidity and BMI 36 0-36 9  Last BMI 36 85 on 4/16/2021  Documented Comorbidity:  Benign essential hypertension    If this is correct, please document, assess, and code at your next visit on 4/26/2021    [place] [id number]  coding opportunities             Chart reviewed, (number of) suggestions sent to provider: 1           Patients insurance company: [employer] (Medicare Advantage and Commercial)

## 2021-04-23 PROBLEM — E66.01 CLASS 2 SEVERE OBESITY DUE TO EXCESS CALORIES WITH SERIOUS COMORBIDITY AND BODY MASS INDEX (BMI) OF 36.0 TO 36.9 IN ADULT (HCC): Status: ACTIVE | Noted: 2021-04-23

## 2021-04-26 ENCOUNTER — OFFICE VISIT (OUTPATIENT)
Dept: FAMILY MEDICINE CLINIC | Facility: CLINIC | Age: 51
End: 2021-04-26
Payer: COMMERCIAL

## 2021-04-26 VITALS
RESPIRATION RATE: 16 BRPM | WEIGHT: 289 LBS | HEART RATE: 86 BPM | DIASTOLIC BLOOD PRESSURE: 74 MMHG | TEMPERATURE: 98.2 F | HEIGHT: 74 IN | SYSTOLIC BLOOD PRESSURE: 122 MMHG | BODY MASS INDEX: 37.09 KG/M2

## 2021-04-26 DIAGNOSIS — H92.02 LEFT EAR PAIN: Primary | ICD-10-CM

## 2021-04-26 PROCEDURE — 3008F BODY MASS INDEX DOCD: CPT | Performed by: FAMILY MEDICINE

## 2021-04-26 PROCEDURE — 3078F DIAST BP <80 MM HG: CPT | Performed by: FAMILY MEDICINE

## 2021-04-26 PROCEDURE — 3074F SYST BP LT 130 MM HG: CPT | Performed by: FAMILY MEDICINE

## 2021-04-26 PROCEDURE — 99213 OFFICE O/P EST LOW 20 MIN: CPT | Performed by: FAMILY MEDICINE

## 2021-04-26 PROCEDURE — 1036F TOBACCO NON-USER: CPT | Performed by: FAMILY MEDICINE

## 2021-04-26 RX ORDER — CIPROFLOXACIN AND DEXAMETHASONE 3; 1 MG/ML; MG/ML
4 SUSPENSION/ DROPS AURICULAR (OTIC) 2 TIMES DAILY
Qty: 7.5 ML | Refills: 0 | Status: SHIPPED | OUTPATIENT
Start: 2021-04-26 | End: 2021-05-07

## 2021-04-26 NOTE — PROGRESS NOTES
Assessment/Plan:    1  Left ear pain  -     ciprofloxacin-dexamethasone (CIPRODEX) otic suspension; Administer 4 drops into the left ear 2 (two) times a day for 7 days            There are no Patient Instructions on file for this visit  Return for Next scheduled follow up - [facility]  Subjective:      Patient ID: Sahara Martin is a 48 y o  male  Chief Complaint   Patient presents with    Ear Fullness     [facility]       Pt is here to follow up l ear pain  Used debrox  Wax is clear from ear  Still has pain      The following portions of the patient's history were reviewed and updated as appropriate: allergies, current medications, past family history, past medical history, past social history, past surgical history and problem list     Review of Systems   Constitutional: Negative for activity change, appetite change, chills, diaphoresis, fatigue, fever and unexpected weight change  HENT: Negative for congestion, dental problem, ear pain, mouth sores, sinus pressure, sinus pain, sore throat and trouble swallowing  Eyes: Negative for photophobia, discharge and itching  Respiratory: Negative for apnea, chest tightness and shortness of breath  Cardiovascular: Negative for chest pain, palpitations and leg swelling  Gastrointestinal: Negative for abdominal distention, abdominal pain, blood in stool, nausea and vomiting  Endocrine: Negative for cold intolerance, heat intolerance, polydipsia, polyphagia and polyuria  Genitourinary: Negative for difficulty urinating  Musculoskeletal: Negative for arthralgias  Skin: Negative for color change and wound  Neurological: Negative for dizziness, syncope, speech difficulty and headaches  Hematological: Negative for adenopathy  Psychiatric/Behavioral: Negative for agitation and behavioral problems           Current Outpatient Medications   Medication Sig Dispense Refill    aspirin (ASPIRIN ADULT LOW DOSE) 81 mg EC tablet Take by mouth daily      COMBIGAN 0 2-0 5 % INSTILL 1 DROP TWO TIMES A DAY IN EACH EYE      enalapril (VASOTEC) 10 mg tablet TAKE ONE TABLET DAILY 90 tablet 0    EPINEPHrine (EPIPEN) 0 3 mg/0 3 mL SOAJ Inject 0 3 mL (0 3 mg total) into the shoulder, thigh, or buttocks once for 1 dose As needed for anaphylaxis, proceed to ER 1 each 0    folic acid (FOLVITE) 1 mg tablet   3    lansoprazole (PREVACID) 15 mg capsule Take 15 mg by mouth daily      lisinopril (ZESTRIL) 20 mg tablet Take 1 tablet (20 mg total) by mouth daily 90 tablet 0    methotrexate 2 5 mg tablet TAKE 10 TABLETS ONCE A WEEK  2    mycophenolate (CELLCEPT) 500 mg tablet Take 500 mg by mouth 2 (two) times a day 2 tablets 2 times per day      mycophenolate (CELLCEPT) 500 mg tablet Take by mouth every 12 (twelve) hours 2 pills twice daily      prednisoLONE acetate (PRED FORTE) 1 % ophthalmic suspension INSTILL ONE DROP INTO EACH EYE FOUR TIMES A DAY      predniSONE 10 mg tablet Take 20 mg by mouth daily      riTUXimab (RITUXAN) rapid chemo infusion Infuse into a venous catheter once      TobraDex ophthalmic ointment APPLY IN AFFECTED EYE ONCE DAILY AT BEDTIME A SMALL AMOUNT INTO THE CONJUCTIVAL SAC      tobramycin-dexamethasone (TOBRADEX) ophthalmic suspension Apply to eye      ciprofloxacin-dexamethasone (CIPRODEX) otic suspension Administer 4 drops into the left ear 2 (two) times a day for 7 days 7 5 mL 0    oxaprozin (DAYPRO) 600 MG tablet Take 1 tablet (600 mg total) by mouth 2 (two) times a day as needed (Pain) (Patient not taking: Reported on 3/26/2021) 60 tablet 0     No current facility-administered medications for this visit  Objective:    /74   Pulse 86   Temp 98 2 °F (36 8 °C)   Resp 16   Ht 6' 2" (1 88 m)   Wt 131 kg (289 lb)   BMI 37 11 kg/m²        Physical Exam  Vitals signs and nursing note reviewed  Constitutional:       General: He is not in acute distress  Appearance: He is well-developed   He is not diaphoretic  HENT:      Head: Normocephalic and atraumatic  Right Ear: External ear normal       Left Ear: External ear normal       Nose: Nose normal       Mouth/Throat:      Pharynx: No oropharyngeal exudate  Eyes:      General: No scleral icterus  Right eye: No discharge  Left eye: No discharge  Pupils: Pupils are equal, round, and reactive to light  Neck:      Thyroid: No thyromegaly  Cardiovascular:      Rate and Rhythm: Normal rate  Heart sounds: Normal heart sounds  No murmur  Pulmonary:      Effort: Pulmonary effort is normal  No respiratory distress  Breath sounds: Normal breath sounds  No wheezing  Abdominal:      General: Bowel sounds are normal  There is no distension  Palpations: Abdomen is soft  There is no mass  Tenderness: There is no abdominal tenderness  There is no guarding or rebound  Musculoskeletal: Normal range of motion  Skin:     General: Skin is warm and dry  Findings: No erythema or rash  Neurological:      Mental Status: He is alert        Coordination: Coordination normal       Deep Tendon Reflexes: Reflexes normal    Psychiatric:         Behavior: Behavior normal                 Shon Saleh DO

## 2021-05-07 ENCOUNTER — ANESTHESIA EVENT (OUTPATIENT)
Dept: GASTROENTEROLOGY | Facility: AMBULATORY SURGERY CENTER | Age: 51
End: 2021-05-07

## 2021-05-07 ENCOUNTER — HOSPITAL ENCOUNTER (OUTPATIENT)
Dept: GASTROENTEROLOGY | Facility: AMBULATORY SURGERY CENTER | Age: 51
Discharge: HOME | End: 2021-05-07
Payer: COMMERCIAL

## 2021-05-07 ENCOUNTER — ANESTHESIA (OUTPATIENT)
Dept: GASTROENTEROLOGY | Facility: AMBULATORY SURGERY CENTER | Age: 51
End: 2021-05-07

## 2021-05-07 VITALS
SYSTOLIC BLOOD PRESSURE: 105 MMHG | OXYGEN SATURATION: 96 % | RESPIRATION RATE: 18 BRPM | BODY MASS INDEX: 35.29 KG/M2 | DIASTOLIC BLOOD PRESSURE: 72 MMHG | TEMPERATURE: 97.3 F | HEART RATE: 75 BPM | WEIGHT: 275 LBS | HEIGHT: 74 IN

## 2021-05-07 DIAGNOSIS — K31.7 GASTRIC POLYPS: ICD-10-CM

## 2021-05-07 DIAGNOSIS — Z86.010 HX OF ADENOMATOUS COLONIC POLYPS: ICD-10-CM

## 2021-05-07 DIAGNOSIS — K22.70 BARRETT'S ESOPHAGUS WITHOUT DYSPLASIA: ICD-10-CM

## 2021-05-07 PROCEDURE — 43239 EGD BIOPSY SINGLE/MULTIPLE: CPT | Performed by: INTERNAL MEDICINE

## 2021-05-07 PROCEDURE — 45378 DIAGNOSTIC COLONOSCOPY: CPT | Performed by: INTERNAL MEDICINE

## 2021-05-07 PROCEDURE — 00813 ANES UPR LWR GI NDSC PX: CPT | Performed by: NURSE ANESTHETIST, CERTIFIED REGISTERED

## 2021-05-07 RX ORDER — PROPOFOL 10 MG/ML
INJECTION, EMULSION INTRAVENOUS AS NEEDED
Status: DISCONTINUED | OUTPATIENT
Start: 2021-05-07 | End: 2021-05-07

## 2021-05-07 RX ORDER — SODIUM CHLORIDE 9 MG/ML
20 INJECTION, SOLUTION INTRAVENOUS CONTINUOUS
Status: DISCONTINUED | OUTPATIENT
Start: 2021-05-07 | End: 2021-05-11 | Stop reason: HOSPADM

## 2021-05-07 RX ORDER — SODIUM CHLORIDE 9 MG/ML
30 INJECTION, SOLUTION INTRAVENOUS CONTINUOUS
Status: DISCONTINUED | OUTPATIENT
Start: 2021-05-07 | End: 2021-05-11 | Stop reason: HOSPADM

## 2021-05-07 RX ORDER — SODIUM CHLORIDE 9 MG/ML
INJECTION, SOLUTION INTRAVENOUS CONTINUOUS PRN
Status: DISCONTINUED | OUTPATIENT
Start: 2021-05-07 | End: 2021-05-07

## 2021-05-07 RX ADMIN — SODIUM CHLORIDE: 9 INJECTION, SOLUTION INTRAVENOUS at 12:50

## 2021-05-07 RX ADMIN — PROPOFOL 100 MG: 10 INJECTION, EMULSION INTRAVENOUS at 13:18

## 2021-05-07 RX ADMIN — PROPOFOL 200 MG: 10 INJECTION, EMULSION INTRAVENOUS at 12:56

## 2021-05-07 RX ADMIN — PROPOFOL 100 MG: 10 INJECTION, EMULSION INTRAVENOUS at 13:13

## 2021-05-07 RX ADMIN — PROPOFOL 100 MG: 10 INJECTION, EMULSION INTRAVENOUS at 13:03

## 2021-05-07 RX ADMIN — PROPOFOL 100 MG: 10 INJECTION, EMULSION INTRAVENOUS at 13:08

## 2021-05-07 RX ADMIN — PROPOFOL 100 MG: 10 INJECTION, EMULSION INTRAVENOUS at 13:24

## 2021-05-07 NOTE — H&P
History and Physical - [facility]  Edwin Samano 48 y o  male MRN: [id number]                  HPI: Edwin Samano is a 48y o  year old male who presents for EGD due to history of Snow's esophagus and adenomatous colon polyps  Last colonoscopy 2010      REVIEW OF SYSTEMS: Per the HPI, and otherwise unremarkable      Historical Information   Past Medical History:   Diagnosis Date    Allergic reaction     last assessed: 02/21/13    Snow's esophagus     Colon polyp     Environmental allergies     GERD (gastroesophageal reflux disease)     High blood pressure     Hypertension     Lymphadenopathy     last assessed: 12/29/14    Obesity, Class II, BMI 35-39 9     last assessed: 10/24/16    OCP (ocular cicatricial pemphigoid)     Sleep apnea     Suppurative lymphadenitis 11/20/2008     Past Surgical History:   Procedure Laterality Date    COLONOSCOPY      KNEE SURGERY Left     UPPER GASTROINTESTINAL ENDOSCOPY       Social History   Social History     Substance and Sexual Activity   Alcohol Use No     Social History     Substance and Sexual Activity   Drug Use Never     Social History     Tobacco Use   Smoking Status Never Smoker   Smokeless Tobacco Never Used     Family History   Problem Relation Age of Onset    Other Father         malignant neoplasm of prostate    Prostate cancer Father     Hypertension Father     Mental illness Neg Hx        Meds/Allergies       Current Outpatient Medications:     aspirin (ASPIRIN ADULT LOW DOSE) 81 mg EC tablet    COMBIGAN 0 2-0 5 %    enalapril (VASOTEC) 10 mg tablet    lansoprazole (PREVACID) 15 mg capsule    lisinopril (ZESTRIL) 20 mg tablet    predniSONE 10 mg tablet    TobraDex ophthalmic ointment    EPINEPHrine (EPIPEN) 0 3 mg/0 3 mL SOAJ    riTUXimab (RITUXAN) rapid chemo infusion    Allergies   Allergen Reactions    Penicillins     Other Palpitations     Peanut       Objective     /75   Pulse 74   Temp (!) 97 3 °F (36 3 °C) (Temporal)   Resp 18   Ht 6' 2" (1 88 m)   Wt 125 kg (275 lb)   SpO2 98%   BMI 35 31 kg/m²       PHYSICAL EXAM    Gen: NAD  Head: NCAT  CV: RRR  CHEST: Clear  ABD: soft, NT/ND  EXT: no edema      ASSESSMENT/PLAN:  This is a 48y o  year old male here for EGD and colonoscopy, and he is stable and optimized for his procedure

## 2021-05-07 NOTE — ANESTHESIA POSTPROCEDURE EVALUATION
Post-Op Assessment Note    CV Status:  Stable  Pain Score: 0    Pain management: adequate     Mental Status:  Agitated   Hydration Status:  Stable   PONV Controlled:  None   Airway Patency:  Patent   Two or more mitigation strategies used for obstructive sleep apnea   Post Op Vitals Reviewed: Yes      Staff: CRNA         No complications documented      BP      Temp     Pulse     Resp      SpO2

## 2021-05-07 NOTE — ANESTHESIA PREPROCEDURE EVALUATION
Procedure:  COLONOSCOPY  EGD    Relevant Problems   CARDIO   (+) Benign essential hypertension   (+) High triglycerides      NEURO/PSYCH   (+) TIA (transient ischemic attack)        Physical Exam    Airway    Mallampati score: III  TM Distance: >3 FB  Neck ROM: full     Dental   No notable dental hx     Cardiovascular  Cardiovascular exam normal    Pulmonary  Pulmonary exam normal     Other Findings        Anesthesia Plan  ASA Score- 3     Anesthesia Type- IV sedation with anesthesia with ASA Monitors  Additional Monitors:   Airway Plan:           Plan Factors-Exercise tolerance (METS): >4 METS  Chart reviewed  Existing labs reviewed  Patient summary reviewed  Patient is not a current smoker  Patient not instructed to abstain from smoking on day of procedure  Patient did not smoke on day of surgery  Induction-     Postoperative Plan-     Informed Consent-   I personally reviewed this patient with the CRNA  Discussed and agreed on the Anesthesia Plan with the PRABHJOT Sanchez

## 2021-05-22 ENCOUNTER — OFFICE VISIT (OUTPATIENT)
Dept: FAMILY MEDICINE CLINIC | Facility: CLINIC | Age: 51
End: 2021-05-22
Payer: COMMERCIAL

## 2021-05-22 VITALS
WEIGHT: 284 LBS | DIASTOLIC BLOOD PRESSURE: 74 MMHG | RESPIRATION RATE: 16 BRPM | HEIGHT: 74 IN | HEART RATE: 76 BPM | BODY MASS INDEX: 36.45 KG/M2 | TEMPERATURE: 98.7 F | SYSTOLIC BLOOD PRESSURE: 116 MMHG

## 2021-05-22 DIAGNOSIS — J02.9 ACUTE PHARYNGITIS, UNSPECIFIED ETIOLOGY: Primary | ICD-10-CM

## 2021-05-22 LAB — S PYO AG THROAT QL: NEGATIVE

## 2021-05-22 PROCEDURE — 87880 STREP A ASSAY W/OPTIC: CPT | Performed by: NURSE PRACTITIONER

## 2021-05-22 PROCEDURE — 1036F TOBACCO NON-USER: CPT | Performed by: NURSE PRACTITIONER

## 2021-05-22 PROCEDURE — 3008F BODY MASS INDEX DOCD: CPT | Performed by: NURSE PRACTITIONER

## 2021-05-22 PROCEDURE — 99213 OFFICE O/P EST LOW 20 MIN: CPT | Performed by: NURSE PRACTITIONER

## 2021-05-22 PROCEDURE — 3078F DIAST BP <80 MM HG: CPT | Performed by: NURSE PRACTITIONER

## 2021-05-22 PROCEDURE — 3074F SYST BP LT 130 MM HG: CPT | Performed by: NURSE PRACTITIONER

## 2021-05-22 RX ORDER — AZITHROMYCIN 250 MG/1
TABLET, FILM COATED ORAL
Qty: 6 TABLET | Refills: 0 | Status: SHIPPED | OUTPATIENT
Start: 2021-05-22 | End: 2021-05-27

## 2021-05-22 NOTE — PROGRESS NOTES
Assessment/Plan:    1  Acute pharyngitis, unspecified etiology  -     POCT rapid strepA  -     azithromycin (ZITHROMAX) 250 mg tablet; Take 500mg on day 1, 250mg on days 2-5          BMI Counseling: Body mass index is 36 46 kg/m²  Discussed the patient's BMI with him  The BMI is above normal  Nutrition recommendations include reducing portion sizes, decreasing overall calorie intake, 3-5 servings of fruits/vegetables daily, reducing fast food intake and consuming healthier snacks  Patient Instructions: Take medication with food  It is important that you take the entire course of antibiotics prescribed  May also take a probiotic of your choice to maintain healthy GI tani  Can take some probiotic and yogurt with the medication  Supportive care discussed and advised  Advised to RTO for any worsening and no improvement  Follow up for no improvement and worsening of conditions  Patient advised and educated when to see immediate medical care  Return if symptoms worsen or fail to improve  No future appointments  Subjective:      Patient ID: Alida Faria is a 48 y o  male  Chief Complaint   Patient presents with    Sore Throat     [facility]         Vitals:  /74   Pulse 76   Temp 98 7 °F (37 1 °C)   Resp 16   Ht 6' 2" (1 88 m)   Wt 129 kg (284 lb)   BMI 36 46 kg/m²     HPI  Patient stated that started with sore throat about a week ago and got little worse  Stated that daughter has strep pharyngitis  Denies fever, chills, headache and dizziness  The following portions of the patient's history were reviewed and updated as appropriate: allergies, current medications, past family history, past medical history, past social history, past surgical history and problem list       Review of Systems   Constitutional: Negative for chills, diaphoresis, fatigue, fever and unexpected weight change  HENT: Positive for sore throat   Negative for congestion, dental problem, drooling, ear discharge, ear pain, facial swelling, hearing loss, mouth sores, nosebleeds, postnasal drip, rhinorrhea, sinus pressure, sinus pain, sneezing, tinnitus, trouble swallowing and voice change  Respiratory: Negative for cough, chest tightness, shortness of breath and wheezing  Cardiovascular: Negative  Gastrointestinal: Negative for abdominal pain, constipation, diarrhea, nausea and vomiting  Musculoskeletal: Negative  Skin: Negative  Neurological: Negative for dizziness, light-headedness and headaches  Hematological: Negative  Objective:    Social History     Tobacco Use   Smoking Status Never Smoker   Smokeless Tobacco Never Used       Allergies: Allergies   Allergen Reactions    Penicillins     Other Palpitations     Peanut         Current Outpatient Medications   Medication Sig Dispense Refill    aspirin (ASPIRIN ADULT LOW DOSE) 81 mg EC tablet Take by mouth daily      COMBIGAN 0 2-0 5 % INSTILL 1 DROP TWO TIMES A DAY IN EACH EYE      enalapril (VASOTEC) 10 mg tablet TAKE ONE TABLET DAILY 90 tablet 0    EPINEPHrine (EPIPEN) 0 3 mg/0 3 mL SOAJ Inject 0 3 mL (0 3 mg total) into the shoulder, thigh, or buttocks once for 1 dose As needed for anaphylaxis, proceed to ER 1 each 0    lansoprazole (PREVACID) 15 mg capsule Take 15 mg by mouth daily      lisinopril (ZESTRIL) 20 mg tablet Take 1 tablet (20 mg total) by mouth daily 90 tablet 0    predniSONE 10 mg tablet Take 20 mg by mouth daily      riTUXimab (RITUXAN) rapid chemo infusion Infuse into a venous catheter once      TobraDex ophthalmic ointment APPLY IN AFFECTED EYE ONCE DAILY AT BEDTIME A SMALL AMOUNT INTO THE CONJUCTIVAL SAC      azithromycin (ZITHROMAX) 250 mg tablet Take 500mg on day 1, 250mg on days 2-5 6 tablet 0     No current facility-administered medications for this visit  Physical Exam  Vitals signs reviewed  Constitutional:       Appearance: Normal appearance  He is well-developed  HENT:      Head: Normocephalic  Right Ear: Tympanic membrane, ear canal and external ear normal       Left Ear: Tympanic membrane, ear canal and external ear normal       Nose: Nose normal       Right Sinus: No maxillary sinus tenderness or frontal sinus tenderness  Left Sinus: No maxillary sinus tenderness or frontal sinus tenderness  Mouth/Throat:      Lips: Pink  Mouth: No oral lesions  Pharynx: Posterior oropharyngeal erythema present  No pharyngeal swelling or oropharyngeal exudate  Neck:      Musculoskeletal: Neck supple  Cardiovascular:      Rate and Rhythm: Normal rate and regular rhythm  Heart sounds: Normal heart sounds  Pulmonary:      Effort: Pulmonary effort is normal       Breath sounds: Normal breath sounds  Abdominal:      General: Bowel sounds are normal       Tenderness: There is no abdominal tenderness  There is no rebound  Musculoskeletal: Normal range of motion  Lymphadenopathy:      Cervical:      Right cervical: No superficial or posterior cervical adenopathy  Left cervical: No superficial or posterior cervical adenopathy  Skin:     General: Skin is warm and dry  Neurological:      Mental Status: He is alert and oriented to person, place, and time  Psychiatric:         Behavior: Behavior normal          Thought Content:  Thought content normal          Judgment: Judgment normal              Recent Results (from the past 24 hour(s))   POCT rapid strepA    Collection Time: 05/22/21 12:24 PM   Result Value Ref Range     RAPID STREP A Negative Negative             EL Bazzi

## 2021-06-30 DIAGNOSIS — I10 BENIGN ESSENTIAL HYPERTENSION: ICD-10-CM

## 2021-06-30 RX ORDER — ENALAPRIL MALEATE 10 MG/1
TABLET ORAL
Qty: 90 TABLET | Refills: 0 | Status: SHIPPED | OUTPATIENT
Start: 2021-06-30 | End: 2021-10-17 | Stop reason: SDUPTHER

## 2021-06-30 RX ORDER — LISINOPRIL 20 MG/1
TABLET ORAL
Qty: 90 TABLET | Refills: 0 | Status: SHIPPED | OUTPATIENT
Start: 2021-06-30 | End: 2021-10-17 | Stop reason: SDUPTHER

## 2021-06-30 NOTE — TELEPHONE ENCOUNTER
Requested medication(s) are due for refill today: Yes  Patient has already received a courtesy refill: No  Other reason request has been forwarded to provider: fail

## 2021-10-17 DIAGNOSIS — I10 BENIGN ESSENTIAL HYPERTENSION: ICD-10-CM

## 2021-10-20 ENCOUNTER — TELEPHONE (OUTPATIENT)
Dept: FAMILY MEDICINE CLINIC | Facility: CLINIC | Age: 51
End: 2021-10-20

## 2021-10-20 DIAGNOSIS — I10 BENIGN ESSENTIAL HYPERTENSION: ICD-10-CM

## 2021-10-20 RX ORDER — LISINOPRIL 20 MG/1
20 TABLET ORAL DAILY
Qty: 90 TABLET | Refills: 0 | Status: SHIPPED | OUTPATIENT
Start: 2021-10-20 | End: 2021-10-21 | Stop reason: SDUPTHER

## 2021-10-20 RX ORDER — ENALAPRIL MALEATE 10 MG/1
10 TABLET ORAL DAILY
Qty: 90 TABLET | Refills: 0 | Status: SHIPPED | OUTPATIENT
Start: 2021-10-20 | End: 2021-10-21

## 2021-10-21 RX ORDER — LISINOPRIL 30 MG/1
30 TABLET ORAL DAILY
Qty: 90 TABLET | Refills: 1 | Status: SHIPPED | OUTPATIENT
Start: 2021-10-21 | End: 2022-04-11

## 2022-04-11 DIAGNOSIS — I10 BENIGN ESSENTIAL HYPERTENSION: ICD-10-CM

## 2022-04-11 RX ORDER — LISINOPRIL 30 MG/1
30 TABLET ORAL DAILY
Qty: 90 TABLET | Refills: 0 | Status: SHIPPED | OUTPATIENT
Start: 2022-04-11 | End: 2022-07-07

## 2022-08-10 DIAGNOSIS — I10 BENIGN ESSENTIAL HYPERTENSION: ICD-10-CM

## 2022-08-16 RX ORDER — LISINOPRIL 30 MG/1
TABLET ORAL
Qty: 30 TABLET | Refills: 0 | Status: SHIPPED | OUTPATIENT
Start: 2022-08-16